# Patient Record
Sex: FEMALE | Race: WHITE | Employment: UNEMPLOYED | URBAN - METROPOLITAN AREA
[De-identification: names, ages, dates, MRNs, and addresses within clinical notes are randomized per-mention and may not be internally consistent; named-entity substitution may affect disease eponyms.]

---

## 2017-06-07 LAB — GRBS, EXTERNAL: NORMAL

## 2017-06-24 ENCOUNTER — HOSPITAL ENCOUNTER (OUTPATIENT)
Age: 32
Discharge: HOME | End: 2017-06-24
Attending: OBSTETRICS & GYNECOLOGY | Admitting: OBSTETRICS & GYNECOLOGY
Payer: COMMERCIAL

## 2017-06-24 VITALS
DIASTOLIC BLOOD PRESSURE: 74 MMHG | HEIGHT: 65 IN | RESPIRATION RATE: 18 BRPM | SYSTOLIC BLOOD PRESSURE: 126 MMHG | HEART RATE: 93 BPM | TEMPERATURE: 98.7 F

## 2017-06-24 PROCEDURE — 99283 EMERGENCY DEPT VISIT LOW MDM: CPT

## 2017-06-24 PROCEDURE — 59025 FETAL NON-STRESS TEST: CPT

## 2017-06-24 NOTE — IP AVS SNAPSHOT
[address] 
[phone] Patient: Que Churchill MRN: [id number] OXL:[birth year] You are allergic to the following No active allergies Recent Documentation Height OB Status Smoking Status 1.651 m Pregnant Never Smoker Emergency Contacts Name Discharge Info Relation Home Work Mobile Juancarlos Rizzo  Spouse [3] [phone] About your hospitalization You were admitted on:  June 24, 2017 You last received care in the:  [facility] 4 ANTEPARTUM You were discharged on:  June 24, 2017 Unit phone number:  [phone] Why you were hospitalized Your primary diagnosis was:  Not on File Providers Seen During Your Hospitalizations Provider Role Specialty Primary office phone Sarthak Domínguez MD Attending Provider Obstetrics & Gynecology [phone] Your Primary Care Physician (PCP) Primary Care Physician Office Phone Office Fax Conception Ziggy [phone] Follow-up Information None Your Appointments Monday June 26, 2017  8:45 AM EDT  
OB VISIT with Rosa West MD  
[facility] ([place] [address]) [address] [place] [id number] [phone] Current Discharge Medication List  
  
ASK your doctor about these medications Dose & Instructions Dispensing Information Comments Morning Noon Evening Bedtime  
 butalbital-acetaminophen-caffeine -40 mg per tablet Commonly known as:  Myrtle Carey Your last dose was: Your next dose is:    
   
   
 Dose:  2 Tab Take 2 Tabs by mouth every six (6) hours as needed for Pain. Max Daily Amount: 8 Tabs. Quantity:  30 Tab Refills:  3  
     
   
   
   
  
 folic acid 1 mg tablet Commonly known as:  Yelena Your last dose was: Your next dose is: Dose:  2 mg Take 2 mg by mouth daily. Refills:  0 PRENATAL DHA+COMPLETE PRENATAL -300 mg-mcg-mg Cmpk Generic drug:  [id number] bro-folic-dha Your last dose was: Your next dose is: Take  by mouth. Refills:  0  
     
   
   
   
  
 TYLENOL 325 mg tablet Generic drug:  acetaminophen Your last dose was: Your next dose is: Take  by mouth every four (4) hours as needed for Pain. Refills:  0  
     
   
   
   
  
 VITAMIN D3 1,000 unit tablet Generic drug:  cholecalciferol Your last dose was: Your next dose is: Take  by mouth daily. Refills:  0 Discharge Instructions Week 38 of Your Pregnancy: Care Instructions Your Care Instructions Believe it or not, your baby is almost here. You may have ideas about your baby's personality because of how much he or she moves. Or you may have noticed how he or she responds to sounds, warmth, cold, and light. You may even know what kind of music your baby likes. By now, you have a better idea of what to expect during delivery. You may have talked about your birth preferences with your doctor. But even if you want a vaginal birth, it is a good idea to learn about  births.  birth means that your baby is born through a cut (incision) in your lower belly. It is sometimes the best choice for the health of the baby and the mother. This care sheet can help you understand  births. It also gives you information about what to expect after your baby is born. And it helps you understand more about postpartum depression. Follow-up care is a key part of your treatment and safety. Be sure to make and go to all appointments, and call your doctor if you are having problems. It's also a good idea to know your test results and keep a list of the medicines you take. How can you care for yourself at home? Learn about  birth · Most C-sections are unplanned. They are done because of problems that occur during labor. These problems might include: 
¨ Labor that slows or stops. ¨ High blood pressure or other problems for the mother. ¨ Signs of distress in the baby. These signs may include a very fast or slow heart rate. · Although most mothers and babies do well after , it is major surgery. It has more risks than a vaginal delivery. · In some cases, a planned  may be safer than a vaginal delivery. This may be the case if: ¨ The mother has a health problem, such as a heart condition. ¨ The baby isn't in a head-down position for delivery. This is called a breech position. ¨ The uterus has scars from past surgeries. This could increase the chance of a tear in the uterus. ¨ There is a problem with the placenta. ¨ The mother has an infection, such as genital herpes, that could be spread to the baby. ¨ The mother is having twins or more. ¨ The baby weighs 9 to 10 pounds or more. · Because of the risks of , planned C-sections generally should be done only for medical reasons. And a planned  should be done at 39 weeks or later unless there is a medical reason to do it sooner. Know what to expect after delivery, and plan for the first few weeks at home · You, your baby, and your partner or  will get identification bands. Only people with matching bands can  the baby from the nursery. · You will learn how to feed, diaper, and bathe your baby. And you will learn how to care for the umbilical cord stump. If your baby will be circumcised, you will also learn how to care for that. · Ask people to wait to visit you until you are at home. And ask them to wash their hands before they touch your baby. · Make sure you have another adult in your home for at least 2 or 3 days after the birth. · During the first 2 weeks, limit when friends and family can visit. · Do not allow visitors who have colds or infections. Make sure all visitors are up to date with their vaccinations. Never let anyone smoke around your baby. · Try to nap when the baby naps. Be aware of postpartum depression · \"Baby blues\" are common for the first 1 to 2 weeks after birth. You may cry or feel sad or irritable for no reason. · For some women, these feelings last longer and are more intense. This is called postpartum depression. · If your symptoms last for more than a few weeks or you feel very depressed, ask your doctor for help. · Postpartum depression can be treated. Support groups and counseling can help. Sometimes medicine can also help. Where can you learn more? Go to http://mariam-adonay.info/. Enter B044 in the search box to learn more about \"Week 38 of Your Pregnancy: Care Instructions. \" Current as of: 2017 Content Version: 11.3 © 1052-2138 [employer]. Care instructions adapted under license by [employer] (which disclaims liability or warranty for this information). If you have questions about a medical condition or this instruction, always ask your healthcare professional. [place] [address] any warranty or liability for your use of this information. Pregnancy Precautions: Care Instructions Your Care Instructions There is no sure way to prevent labor before your due date ( labor) or to prevent most other pregnancy problems. But there are things you can do to increase your chances of a healthy pregnancy. Go to your appointments, follow your doctor's advice, and take good care of yourself. Eat well, and exercise (if your doctor agrees). And make sure to drink plenty of water. Follow-up care is a key part of your treatment and safety.  Be sure to make and go to all appointments, and call your doctor if you are having problems. It's also a good idea to know your test results and keep a list of the medicines you take. How can you care for yourself at home? · Make sure you go to your prenatal appointments. At each visit, your doctor will check your blood pressure. Your doctor will also check to see if you have protein in your urine. High blood pressure and protein in urine are signs of preeclampsia. This condition can be dangerous for you and your baby. · Drink plenty of fluids, enough so that your urine is light yellow or clear like water. Dehydration can cause contractions. If you have kidney, heart, or liver disease and have to limit fluids, talk with your doctor before you increase the amount of fluids you drink. · Tell your doctor right away if you notice any symptoms of an infection, such as: ¨ Burning when you urinate. ¨ A foul-smelling discharge from your vagina. ¨ Vaginal itching. ¨ Unexplained fever. ¨ Unusual pain or soreness in your uterus or lower belly. · Eat a balanced diet. Include plenty of foods that are high in calcium and iron. ¨ Foods high in calcium include milk, cheese, yogurt, almonds, and broccoli. ¨ Foods high in iron include red meat, shellfish, poultry, eggs, beans, raisins, whole-grain bread, and leafy green vegetables. · Do not smoke. If you need help quitting, talk to your doctor about stop-smoking programs and medicines. These can increase your chances of quitting for good. · Do not drink alcohol or use illegal drugs. · Follow your doctor's directions about activity. Your doctor will let you know how much, if any, exercise you can do. · Ask your doctor if you can have sex. If you are at risk for early labor, your doctor may ask you to not have sex. · Take care to prevent falls. During pregnancy, your joints are loose, and your balance is off. Sports such as bicycling, skiing, or in-line skating can increase your risk of falling.  And don't ride horses or motorcycles, dive, water ski, scuba dive, or parachute jump while you are pregnant. · Avoid getting very hot. Do not use saunas or hot tubs. Avoid staying out in the sun in hot weather for long periods. Take acetaminophen (Tylenol) to lower a high fever. · Do not take any over-the-counter or herbal medicines or supplements without talking to your doctor or pharmacist first. 
When should you call for help? Call 911 anytime you think you may need emergency care. For example, call if: 
· You passed out (lost consciousness). · You have severe vaginal bleeding. · You have severe pain in your belly or pelvis. · You have had fluid gushing or leaking from your vagina and you know or think the umbilical cord is bulging into your vagina. If this happens, immediately get down on your knees so your rear end (buttocks) is higher than your head. This will decrease the pressure on the cord until help arrives. Call your doctor now or seek immediate medical care if: 
· You have signs of preeclampsia, such as: 
¨ Sudden swelling of your face, hands, or feet. ¨ New vision problems (such as dimness or blurring). ¨ A severe headache. · You have any vaginal bleeding. · You have belly pain or cramping. · You have a fever. · You have had regular contractions (with or without pain) for an hour. This means that you have 8 or more within 1 hour or 4 or more in 20 minutes after you change your position and drink fluids. · You have a sudden release of fluid from your vagina. · You have low back pain or pelvic pressure that does not go away. · You notice that your baby has stopped moving or is moving much less than normal. 
Watch closely for changes in your health, and be sure to contact your doctor if you have any problems. Where can you learn more? Go to http://mariam-adonay.info/. Enter [id number] in the search box to learn more about \"Pregnancy Precautions: Care Instructions. \" Current as of: March 16, 2017 Content Version: 11.3 © 8703-3313 [employer]. Care instructions adapted under license by [employer] (which disclaims liability or warranty for this information). If you have questions about a medical condition or this instruction, always ask your healthcare professional. [address] any warranty or liability for your use of this information. Discharge Orders None Introducing [facility]! Dear Gabriel Mason: 
Thank you for requesting a Clari account. Our records indicate that you already have an active Clari account. You can access your account anytime at [email or web address] Did you know that you can access your hospital and ER discharge instructions at any time in Clari? You can also review all of your test results from your hospital stay or ER visit. Additional Information If you have questions, please visit the Frequently Asked Questions section of the Clari website at [email or web address] Remember, Clari is NOT to be used for urgent needs. For medical emergencies, dial 911. Now available from your iPhone and Android! General Information Please provide this summary of care documentation to your next provider. Patient Signature:  ____________________________________________________________ Date:  ____________________________________________________________  
  
Dora Hazel Provider Signature:  ____________________________________________________________ Date:  ____________________________________________________________

## 2017-06-24 NOTE — H&P
CC  Chief Complaint   Patient presents with    Decreased Fetal Movement       History:    33 y.o. female at 38w6d weeks gestation who requesting evaluation for Decreased fetal movement this morning. No ha/vision changes/epigastric pain or other complaints. HISTORY:  OB History    Para Term  AB Living   2 1 1   1   SAB TAB Ectopic Multiple Live Births       1      # Outcome Date GA Lbr Mor/2nd Weight Sex Delivery Anes PTL Lv   2 Current            1 Term 10/24/14 39w5d 12:13 / 00:47 3.8 kg F VAGINAL DELI EPIDURAL AN N YANCI      Obstetric Comments   Pt here for nob talk. Pt declines all genetic testing. All questions answered. Return 4 weeks for nob exam and repeat ultrasound. History   Sexual Activity    Sexual activity: Yes    Partners: Male    Birth control/ protection: None     Patient's last menstrual period was 2016 (exact date). Social History     Social History    Marital status:      Spouse name: N/A    Number of children: N/A    Years of education: N/A     Occupational History    Not on file. Social History Main Topics    Smoking status: Never Smoker    Smokeless tobacco: Never Used    Alcohol use 1.2 - 1.8 oz/week     2 - 3 Glasses of wine per week      Comment: Moderate    Drug use: No    Sexual activity: Yes     Partners: Male     Birth control/ protection: None     Other Topics Concern    Not on file     Social History Narrative       Past Surgical History:   Procedure Laterality Date    HX CRANIOTOMY      for meningioma-2016    HX ORTHOPAEDIC      foot surgery    HX OTHER SURGICAL      Foot    HX OTHER SURGICAL  2016    Brain Tumor    HX WISDOM TEETH EXTRACTION         Past Medical History:   Diagnosis Date    Heart abnormality     low BP    Heart abnormality     possible TIA?     Kidney disease     excess proteinuria in early pregnancy    Meningioma [facility])     Dr. Stella Mckeon follows    Migraines     PFO (patent foramen ovale) Dr. Nasrin Mcneill is Cardiologist    Proteinuria     during last pregnancy    Stomach discomfort          ROS:  As per hpi    PHYSICAL EXAM:  Blood pressure 126/74, pulse 93, temperature 98.7 °F (37.1 °C), resp. rate 18, height 5' 5\" (1.651 m), last menstrual period 09/25/2016, unknown if currently breastfeeding. General: healthy, alert, well developed, well nourished and cooperative  Resp:  breath sounds clear and equal bilaterally  Card:  RRR and no MRG  Abd: WNL. Fetal Assessment: Baseline FHR: 150 per minute     Fetal heart variability: moderate     Fetal Heart Rate decelerations: none     Fetal Heart Rate accelerations: yes   Prestentation: vertex by exam  Pelvic:   {performedExternal- normal EGBSU w/o lesions   Sve? 50/1/-2  Ext: no edema  Assessment:  33 y.o. female at 38w6d weeks gestation with decreased fetal movement  with reassuring fetal status  [unfilled]    Plan:  Discharge home with routine labor instructions and warning signs, Keep follow up appointment with regular provider as scheduled and Instructed in fetal activity monitoring    Yeni Shelton MD

## 2017-06-24 NOTE — PROGRESS NOTES
FETAL SURVEILLANCE TESTING SUMMARY    INDICATIONS:  decreased fetal movement    OBJECTIVE RESULTS:  Fetal heart variability: moderate  Fetal Heart Rate decelerations: none  Fetal Heart Rate accelerations: yes  Baseline FHR: 145 per minute  Uterine contractions: none    Fetal surveillance: reassuring    Cordell Young MD

## 2017-06-24 NOTE — DISCHARGE INSTRUCTIONS
Week 38 of Your Pregnancy: Care Instructions  Your Care Instructions    Believe it or not, your baby is almost here. You may have ideas about your baby's personality because of how much he or she moves. Or you may have noticed how he or she responds to sounds, warmth, cold, and light. You may even know what kind of music your baby likes. By now, you have a better idea of what to expect during delivery. You may have talked about your birth preferences with your doctor. But even if you want a vaginal birth, it is a good idea to learn about  births.  birth means that your baby is born through a cut (incision) in your lower belly. It is sometimes the best choice for the health of the baby and the mother. This care sheet can help you understand  births. It also gives you information about what to expect after your baby is born. And it helps you understand more about postpartum depression. Follow-up care is a key part of your treatment and safety. Be sure to make and go to all appointments, and call your doctor if you are having problems. It's also a good idea to know your test results and keep a list of the medicines you take. How can you care for yourself at home? Learn about  birth  · Most C-sections are unplanned. They are done because of problems that occur during labor. These problems might include:  ¨ Labor that slows or stops. ¨ High blood pressure or other problems for the mother. ¨ Signs of distress in the baby. These signs may include a very fast or slow heart rate. · Although most mothers and babies do well after , it is major surgery. It has more risks than a vaginal delivery. · In some cases, a planned  may be safer than a vaginal delivery. This may be the case if:  ¨ The mother has a health problem, such as a heart condition. ¨ The baby isn't in a head-down position for delivery. This is called a breech position.   ¨ The uterus has scars from past surgeries. This could increase the chance of a tear in the uterus. ¨ There is a problem with the placenta. ¨ The mother has an infection, such as genital herpes, that could be spread to the baby. ¨ The mother is having twins or more. ¨ The baby weighs 9 to 10 pounds or more. · Because of the risks of , planned C-sections generally should be done only for medical reasons. And a planned  should be done at 39 weeks or later unless there is a medical reason to do it sooner. Know what to expect after delivery, and plan for the first few weeks at home  · You, your baby, and your partner or  will get identification bands. Only people with matching bands can  the baby from the nursery. · You will learn how to feed, diaper, and bathe your baby. And you will learn how to care for the umbilical cord stump. If your baby will be circumcised, you will also learn how to care for that. · Ask people to wait to visit you until you are at home. And ask them to wash their hands before they touch your baby. · Make sure you have another adult in your home for at least 2 or 3 days after the birth. · During the first 2 weeks, limit when friends and family can visit. · Do not allow visitors who have colds or infections. Make sure all visitors are up to date with their vaccinations. Never let anyone smoke around your baby. · Try to nap when the baby naps. Be aware of postpartum depression  · \"Baby blues\" are common for the first 1 to 2 weeks after birth. You may cry or feel sad or irritable for no reason. · For some women, these feelings last longer and are more intense. This is called postpartum depression. · If your symptoms last for more than a few weeks or you feel very depressed, ask your doctor for help. · Postpartum depression can be treated. Support groups and counseling can help. Sometimes medicine can also help. Where can you learn more?   Go to http://mariam-adonay.info/. Enter B044 in the search box to learn more about \"Week 38 of Your Pregnancy: Care Instructions. \"  Current as of: 2017  Content Version: 11.3  © 6480-7573 [employer]. Care instructions adapted under license by [employer] (which disclaims liability or warranty for this information). If you have questions about a medical condition or this instruction, always ask your healthcare professional. [address] any warranty or liability for your use of this information. Pregnancy Precautions: Care Instructions  Your Care Instructions  There is no sure way to prevent labor before your due date ( labor) or to prevent most other pregnancy problems. But there are things you can do to increase your chances of a healthy pregnancy. Go to your appointments, follow your doctor's advice, and take good care of yourself. Eat well, and exercise (if your doctor agrees). And make sure to drink plenty of water. Follow-up care is a key part of your treatment and safety. Be sure to make and go to all appointments, and call your doctor if you are having problems. It's also a good idea to know your test results and keep a list of the medicines you take. How can you care for yourself at home? · Make sure you go to your prenatal appointments. At each visit, your doctor will check your blood pressure. Your doctor will also check to see if you have protein in your urine. High blood pressure and protein in urine are signs of preeclampsia. This condition can be dangerous for you and your baby. · Drink plenty of fluids, enough so that your urine is light yellow or clear like water. Dehydration can cause contractions. If you have kidney, heart, or liver disease and have to limit fluids, talk with your doctor before you increase the amount of fluids you drink.   · Tell your doctor right away if you notice any symptoms of an infection, such as:  ¨ Burning when you urinate. ¨ A foul-smelling discharge from your vagina. ¨ Vaginal itching. ¨ Unexplained fever. ¨ Unusual pain or soreness in your uterus or lower belly. · Eat a balanced diet. Include plenty of foods that are high in calcium and iron. ¨ Foods high in calcium include milk, cheese, yogurt, almonds, and broccoli. ¨ Foods high in iron include red meat, shellfish, poultry, eggs, beans, raisins, whole-grain bread, and leafy green vegetables. · Do not smoke. If you need help quitting, talk to your doctor about stop-smoking programs and medicines. These can increase your chances of quitting for good. · Do not drink alcohol or use illegal drugs. · Follow your doctor's directions about activity. Your doctor will let you know how much, if any, exercise you can do. · Ask your doctor if you can have sex. If you are at risk for early labor, your doctor may ask you to not have sex. · Take care to prevent falls. During pregnancy, your joints are loose, and your balance is off. Sports such as bicycling, skiing, or in-line skating can increase your risk of falling. And don't ride horses or motorcycles, dive, water ski, scuba dive, or parachute jump while you are pregnant. · Avoid getting very hot. Do not use saunas or hot tubs. Avoid staying out in the sun in hot weather for long periods. Take acetaminophen (Tylenol) to lower a high fever. · Do not take any over-the-counter or herbal medicines or supplements without talking to your doctor or pharmacist first.  When should you call for help? Call 911 anytime you think you may need emergency care. For example, call if:  · You passed out (lost consciousness). · You have severe vaginal bleeding. · You have severe pain in your belly or pelvis. · You have had fluid gushing or leaking from your vagina and you know or think the umbilical cord is bulging into your vagina.  If this happens, immediately get down on your knees so your rear end (buttocks) is higher than your head. This will decrease the pressure on the cord until help arrives. Call your doctor now or seek immediate medical care if:  · You have signs of preeclampsia, such as:  ¨ Sudden swelling of your face, hands, or feet. ¨ New vision problems (such as dimness or blurring). ¨ A severe headache. · You have any vaginal bleeding. · You have belly pain or cramping. · You have a fever. · You have had regular contractions (with or without pain) for an hour. This means that you have 8 or more within 1 hour or 4 or more in 20 minutes after you change your position and drink fluids. · You have a sudden release of fluid from your vagina. · You have low back pain or pelvic pressure that does not go away. · You notice that your baby has stopped moving or is moving much less than normal.  Watch closely for changes in your health, and be sure to contact your doctor if you have any problems. Where can you learn more? Go to http://mariam-adonay.info/. Enter [id number] in the search box to learn more about \"Pregnancy Precautions: Care Instructions. \"  Current as of: March 16, 2017  Content Version: 11.3  © 2866-0435 [employer]. Care instructions adapted under license by [employer] (which disclaims liability or warranty for this information). If you have questions about a medical condition or this instruction, always ask your healthcare professional. [place] [address] any warranty or liability for your use of this information.

## 2017-06-24 NOTE — PROGRESS NOTES
MD in assessing SVE as charted. Pt to discharge to home. Discharge instructions provided and reviewed. Questions answered.

## 2017-06-24 NOTE — IP AVS SNAPSHOT
Current Discharge Medication List  
  
ASK your doctor about these medications Dose & Instructions Dispensing Information Comments Morning Noon Evening Bedtime  
 butalbital-acetaminophen-caffeine -40 mg per tablet Commonly known as:  Camila Xiao Your last dose was: Your next dose is:    
   
   
 Dose:  2 Tab Take 2 Tabs by mouth every six (6) hours as needed for Pain. Max Daily Amount: 8 Tabs. Quantity:  30 Tab Refills:  3  
     
   
   
   
  
 folic acid 1 mg tablet Commonly known as:  Yelena Your last dose was: Your next dose is:    
   
   
 Dose:  2 mg Take 2 mg by mouth daily. Refills:  0 PRENATAL DHA+COMPLETE PRENATAL -300 mg-mcg-mg Cmpk Generic drug:  [id number] bro-folic-dha Your last dose was: Your next dose is: Take  by mouth. Refills:  0  
     
   
   
   
  
 TYLENOL 325 mg tablet Generic drug:  acetaminophen Your last dose was: Your next dose is: Take  by mouth every four (4) hours as needed for Pain. Refills:  0  
     
   
   
   
  
 VITAMIN D3 1,000 unit tablet Generic drug:  cholecalciferol Your last dose was: Your next dose is: Take  by mouth daily. Refills:  0

## 2017-07-03 ENCOUNTER — ANESTHESIA (OUTPATIENT)
Dept: LABOR AND DELIVERY | Age: 32
DRG: 372 | End: 2017-07-03
Payer: COMMERCIAL

## 2017-07-03 ENCOUNTER — ANESTHESIA EVENT (OUTPATIENT)
Dept: LABOR AND DELIVERY | Age: 32
DRG: 372 | End: 2017-07-03
Payer: COMMERCIAL

## 2017-07-03 ENCOUNTER — HOSPITAL ENCOUNTER (INPATIENT)
Age: 32
LOS: 1 days | Discharge: HOME | DRG: 372 | End: 2017-07-04
Attending: OBSTETRICS & GYNECOLOGY | Admitting: OBSTETRICS & GYNECOLOGY
Payer: COMMERCIAL

## 2017-07-03 DIAGNOSIS — Z37.9 NORMAL LABOR: Primary | ICD-10-CM

## 2017-07-03 LAB
ABO + RH BLD: NORMAL
BASE DEFICIT BLDCOA-SCNC: 2.7 MMOL/L (ref 0–2)
BASE DEFICIT BLDCOV-SCNC: 4 MMOL/L (ref 1.9–7.7)
BDY SITE: ABNORMAL
BDY SITE: NORMAL
BLOOD GROUP ANTIBODIES SERPL: NORMAL
ERYTHROCYTE [DISTWIDTH] IN BLOOD BY AUTOMATED COUNT: 13.8 % (ref 11.9–14.6)
HCO3 BLDCOA-SCNC: 25 MMOL/L (ref 22–26)
HCO3 BLDV-SCNC: 21 MMOL/L
HCT VFR BLD AUTO: 37.5 % (ref 35.8–46.3)
HGB BLD-MCNC: 12.7 G/DL (ref 11.7–15.4)
MCH RBC QN AUTO: 31.1 PG (ref 26.1–32.9)
MCHC RBC AUTO-ENTMCNC: 33.9 G/DL (ref 31.4–35)
MCV RBC AUTO: 91.9 FL (ref 79.6–97.8)
PCO2 BLDCOA: 58 MMHG (ref 33–49)
PCO2 BLDCOV: 36 MMHG (ref 14.1–43.3)
PH BLDCOA: 7.26 [PH] (ref 7.21–7.31)
PH BLDCOV: 7.37 [PH] (ref 7.2–7.44)
PLATELET # BLD AUTO: 123 K/UL (ref 150–450)
PMV BLD AUTO: 11.4 FL (ref 10.8–14.1)
PO2 BLDCOA: 12 MMHG (ref 9–19)
PO2 BLDV: 31 MMHG (ref 30.4–57.2)
RBC # BLD AUTO: 4.08 M/UL (ref 4.05–5.25)
SERVICE CMNT-IMP: ABNORMAL
SERVICE CMNT-IMP: NORMAL
SPECIMEN EXP DATE BLD: NORMAL
WBC # BLD AUTO: 11.6 K/UL (ref 4.3–11.1)

## 2017-07-03 PROCEDURE — 99283 EMERGENCY DEPT VISIT LOW MDM: CPT

## 2017-07-03 PROCEDURE — 75410000003 HC RECOV DEL/VAG/CSECN EA 0.5 HR

## 2017-07-03 PROCEDURE — 86900 BLOOD TYPING SEROLOGIC ABO: CPT | Performed by: OBSTETRICS & GYNECOLOGY

## 2017-07-03 PROCEDURE — 85027 COMPLETE CBC AUTOMATED: CPT | Performed by: OBSTETRICS & GYNECOLOGY

## 2017-07-03 PROCEDURE — 76060000078 HC EPIDURAL ANESTHESIA

## 2017-07-03 PROCEDURE — 75410000002 HC LABOR FEE PER 1 HR

## 2017-07-03 PROCEDURE — 77030018846 HC SOL IRR STRL H20 ICUM -A

## 2017-07-03 PROCEDURE — 65270000029 HC RM PRIVATE

## 2017-07-03 PROCEDURE — A4300 CATH IMPL VASC ACCESS PORTAL: HCPCS | Performed by: NURSE ANESTHETIST, CERTIFIED REGISTERED

## 2017-07-03 PROCEDURE — 75410000000 HC DELIVERY VAGINAL/SINGLE

## 2017-07-03 PROCEDURE — 4A1HXCZ MONITORING OF PRODUCTS OF CONCEPTION, CARDIAC RATE, EXTERNAL APPROACH: ICD-10-PCS | Performed by: OBSTETRICS & GYNECOLOGY

## 2017-07-03 PROCEDURE — 82803 BLOOD GASES ANY COMBINATION: CPT

## 2017-07-03 PROCEDURE — 74011250636 HC RX REV CODE- 250/636

## 2017-07-03 PROCEDURE — 74011250636 HC RX REV CODE- 250/636: Performed by: OBSTETRICS & GYNECOLOGY

## 2017-07-03 PROCEDURE — 77030014125 HC TY EPDRL BBMI -B: Performed by: NURSE ANESTHETIST, CERTIFIED REGISTERED

## 2017-07-03 PROCEDURE — 59025 FETAL NON-STRESS TEST: CPT

## 2017-07-03 RX ORDER — ROPIVACAINE HYDROCHLORIDE 5 MG/ML
INJECTION, SOLUTION EPIDURAL; INFILTRATION; PERINEURAL AS NEEDED
Status: DISCONTINUED | OUTPATIENT
Start: 2017-07-03 | End: 2017-07-03 | Stop reason: HOSPADM

## 2017-07-03 RX ORDER — LIDOCAINE HYDROCHLORIDE 10 MG/ML
1 INJECTION INFILTRATION; PERINEURAL
Status: DISCONTINUED | OUTPATIENT
Start: 2017-07-03 | End: 2017-07-03 | Stop reason: HOSPADM

## 2017-07-03 RX ORDER — BUTALBITAL, ACETAMINOPHEN AND CAFFEINE 50; 325; 40 MG/1; MG/1; MG/1
2 TABLET ORAL
Status: DISCONTINUED | OUTPATIENT
Start: 2017-07-03 | End: 2017-07-05 | Stop reason: HOSPADM

## 2017-07-03 RX ORDER — SODIUM CHLORIDE 0.9 % (FLUSH) 0.9 %
5-10 SYRINGE (ML) INJECTION EVERY 8 HOURS
Status: DISCONTINUED | OUTPATIENT
Start: 2017-07-03 | End: 2017-07-03

## 2017-07-03 RX ORDER — LIDOCAINE HYDROCHLORIDE 20 MG/ML
JELLY TOPICAL
Status: DISCONTINUED | OUTPATIENT
Start: 2017-07-03 | End: 2017-07-03 | Stop reason: HOSPADM

## 2017-07-03 RX ORDER — ZOLPIDEM TARTRATE 5 MG/1
5 TABLET ORAL
Status: DISCONTINUED | OUTPATIENT
Start: 2017-07-03 | End: 2017-07-05 | Stop reason: HOSPADM

## 2017-07-03 RX ORDER — IBUPROFEN 400 MG/1
400 TABLET ORAL
Status: DISCONTINUED | OUTPATIENT
Start: 2017-07-03 | End: 2017-07-05 | Stop reason: HOSPADM

## 2017-07-03 RX ORDER — MINERAL OIL
120 OIL (ML) ORAL
Status: DISCONTINUED | OUTPATIENT
Start: 2017-07-03 | End: 2017-07-03 | Stop reason: HOSPADM

## 2017-07-03 RX ORDER — SODIUM CHLORIDE 0.9 % (FLUSH) 0.9 %
5-10 SYRINGE (ML) INJECTION AS NEEDED
Status: DISCONTINUED | OUTPATIENT
Start: 2017-07-03 | End: 2017-07-03

## 2017-07-03 RX ORDER — ROPIVACAINE HYDROCHLORIDE 2 MG/ML
INJECTION, SOLUTION EPIDURAL; INFILTRATION; PERINEURAL
Status: DISCONTINUED | OUTPATIENT
Start: 2017-07-03 | End: 2017-07-03 | Stop reason: HOSPADM

## 2017-07-03 RX ORDER — SIMETHICONE 80 MG
80 TABLET,CHEWABLE ORAL
Status: DISCONTINUED | OUTPATIENT
Start: 2017-07-03 | End: 2017-07-05 | Stop reason: HOSPADM

## 2017-07-03 RX ORDER — HYDROCODONE BITARTRATE AND ACETAMINOPHEN 10; 325 MG/1; MG/1
1 TABLET ORAL
Status: DISCONTINUED | OUTPATIENT
Start: 2017-07-03 | End: 2017-07-05 | Stop reason: HOSPADM

## 2017-07-03 RX ORDER — BUTORPHANOL TARTRATE 1 MG/ML
1 INJECTION INTRAMUSCULAR; INTRAVENOUS
Status: DISCONTINUED | OUTPATIENT
Start: 2017-07-03 | End: 2017-07-03 | Stop reason: HOSPADM

## 2017-07-03 RX ORDER — HYDROCORTISONE ACETATE PRAMOXINE HCL 2.5; 1 G/100G; G/100G
CREAM TOPICAL 2 TIMES DAILY
Status: DISCONTINUED | OUTPATIENT
Start: 2017-07-04 | End: 2017-07-05 | Stop reason: HOSPADM

## 2017-07-03 RX ORDER — DIPHENHYDRAMINE HCL 25 MG
25 CAPSULE ORAL
Status: DISCONTINUED | OUTPATIENT
Start: 2017-07-03 | End: 2017-07-05 | Stop reason: HOSPADM

## 2017-07-03 RX ORDER — NALOXONE HYDROCHLORIDE 0.4 MG/ML
0.4 INJECTION, SOLUTION INTRAMUSCULAR; INTRAVENOUS; SUBCUTANEOUS AS NEEDED
Status: DISCONTINUED | OUTPATIENT
Start: 2017-07-03 | End: 2017-07-05 | Stop reason: HOSPADM

## 2017-07-03 RX ORDER — OXYTOCIN/RINGER'S LACTATE 15/250 ML
250 PLASTIC BAG, INJECTION (ML) INTRAVENOUS ONCE
Status: COMPLETED | OUTPATIENT
Start: 2017-07-03 | End: 2017-07-03

## 2017-07-03 RX ORDER — HYDROCODONE BITARTRATE AND ACETAMINOPHEN 5; 325 MG/1; MG/1
1 TABLET ORAL
Status: DISCONTINUED | OUTPATIENT
Start: 2017-07-03 | End: 2017-07-05 | Stop reason: HOSPADM

## 2017-07-03 RX ORDER — DEXTROSE, SODIUM CHLORIDE, SODIUM LACTATE, POTASSIUM CHLORIDE, AND CALCIUM CHLORIDE 5; .6; .31; .03; .02 G/100ML; G/100ML; G/100ML; G/100ML; G/100ML
125 INJECTION, SOLUTION INTRAVENOUS CONTINUOUS
Status: DISCONTINUED | OUTPATIENT
Start: 2017-07-03 | End: 2017-07-03

## 2017-07-03 RX ADMIN — ROPIVACAINE HYDROCHLORIDE 13 ML: 5 INJECTION, SOLUTION EPIDURAL; INFILTRATION; PERINEURAL at 18:03

## 2017-07-03 RX ADMIN — ROPIVACAINE HYDROCHLORIDE 8 ML/HR: 2 INJECTION, SOLUTION EPIDURAL; INFILTRATION; PERINEURAL at 18:06

## 2017-07-03 RX ADMIN — Medication 15000 MILLI-UNITS/HR: at 20:24

## 2017-07-03 NOTE — IP AVS SNAPSHOT
Thaddeus Navneet 
 
 
 [id number] 
[phone] Patient: Madelyn Akers MRN: [id number] AMU:[birth year] You are allergic to the following No active allergies Recent Documentation Breastfeeding? OB Status Smoking Status Unknown Recent pregnancy Never Smoker Emergency Contacts Name Discharge Info Relation Home Work Mobile Juancarlos Rizzo  Spouse [3] [phone] About your hospitalization You were admitted on:  July 3, 2017 You last received care in the:  [address] [facility] You were discharged on:  July 4, 2017 Unit phone number:  [phone] Why you were hospitalized Your primary diagnosis was:  Not on File Your diagnoses also included:  Normal Labor Providers Seen During Your Hospitalizations Provider Role Specialty Primary office phone Yahaira Tse DO Attending Provider Obstetrics & Gynecology [phone] Your Primary Care Physician (PCP) Primary Care Physician Office Phone Office Fax [place] [phone] Follow-up Information Follow up With Details Comments Contact Info Thera Goodell, MD   [address] [facility] [address] [facility] 
[phone] Hua Perry MD In 2 weeks  [address] [place] [address] OB GYN Group [facility] [address] 
[phone] Your Appointments Thursday July 20, 2017 10:30 AM EDT [facility]Part[facility] 2 week with Cade Gandhi MD  
[address] [place] [address] [phone] Current Discharge Medication List  
  
START taking these medications Dose & Instructions Dispensing Information Comments Morning Noon Evening Bedtime HYDROcodone-acetaminophen 5-325 mg per tablet Commonly known as:  Alma Rosa Mcdonnell  
   
 Your last dose was: Your next dose is:    
   
   
 Dose:  1 Tab Take 1 Tab by mouth every four (4) hours as needed. Max Daily Amount: 6 Tabs. Quantity:  20 Tab Refills:  0  
     
   
   
   
  
 ibuprofen 800 mg tablet Commonly known as:  MOTRIN Your last dose was: Your next dose is:    
   
   
 Dose:  800 mg Take 1 Tab by mouth every eight (8) hours as needed. Quantity:  90 Tab Refills:  0  
     
   
   
   
  
 WINN'S NIPPLE OINTMENT AMB ONLY Your last dose was: Your next dose is:    
   
   
 Apply  to affected area three (3) times daily. Quantity:  30 g Refills:  3 CONTINUE these medications which have NOT CHANGED Dose & Instructions Dispensing Information Comments Morning Noon Evening Bedtime  
 pramoxine-hydrocortisone 1-1 % rectal cream  
Commonly known as:  ANALPRAM HC 1% Your last dose was: Your next dose is: Insert  into rectum two (2) times a day. Quantity:  30 g Refills:  3 PRENATAL DHA+COMPLETE PRENATAL -300 mg-mcg-mg Cmpk Generic drug:  [id number] bro-folic-dha Your last dose was: Your next dose is: Take  by mouth. Refills:  0 STOP taking these medications   
 butalbital-acetaminophen-caffeine -40 mg per tablet Commonly known as:  FIORICET, ESGIC  
   
  
 folic acid 1 mg tablet Commonly known as:  FOLVITE  
   
  
 TYLENOL 325 mg tablet Generic drug:  acetaminophen VITAMIN D3 1,000 unit tablet Generic drug:  cholecalciferol Where to Get Your Medications These medications were sent to [facility] [address], [address] Phone:  [phone]  
  ibuprofen 800 mg tablet Information on where to get these meds will be given to you by the nurse or doctor. ! Ask your nurse or doctor about these medications HYDROcodone-acetaminophen 5-325 mg per tablet WINN'S NIPPLE OINTMENT AMB ONLY Discharge Instructions Vaginal Childbirth: Care Instructions Your Care Instructions Your body will slowly heal in the next few weeks. It is easy to get too tired and overwhelmed during the first weeks after your baby is born. Changes in your hormones can shift your mood without warning. You may find it hard to meet the extra demands on your energy and time. Take it easy on yourself. Follow-up care is a key part of your treatment and safety. Be sure to make and go to all appointments, and call your doctor if you are having problems. It's also a good idea to know your test results and keep a list of the medicines you take. How can you care for yourself at home? · Vaginal bleeding and cramps ¨ After delivery, you will have a bloody discharge from the vagina. This will turn pink within a week and then white or yellow after about 10 days. It may last for 2 to 4 weeks or longer, until the uterus has healed. Use pads instead of tampons until you stop bleeding. ¨ Do not worry if you pass some blood clots, as long as they are smaller than a golf ball. If you have a tear or stitches in your vaginal area, change the pad at least every 4 hours to prevent soreness and infection. ¨ You may have cramps for the first few days after childbirth. These are normal and occur as the uterus shrinks to normal size. Take an over-the-counter pain medicine, such as acetaminophen (Tylenol), ibuprofen (Advil, Motrin), or naproxen (Aleve), for cramps. Read and follow all instructions on the label. Do not take aspirin, because it can cause more bleeding. ¨ Do not take two or more pain medicines at the same time unless the doctor told you to. Many pain medicines have acetaminophen, which is Tylenol. Too much acetaminophen (Tylenol) can be harmful. · Stitches ¨ If you have stitches, they will dissolve on their own and do not need to be removed. Follow your doctor's instructions for cleaning the stitched area. ¨ Put ice or a cold pack on your painful area for 10 to 20 minutes at a time, several times a day, for the first few days. Put a thin cloth between the ice and your skin. ¨ Sit in a few inches of warm water (sitz bath) 3 times a day and after bowel movements. The warm water helps with pain and itching. If you do not have a tub, a warm shower might help. · Breast fullness ¨ Your breasts may overfill (engorge) in the first few days after delivery. To help milk flow and to relieve pain, warm your breasts in the shower or by using warm, moist towels before nursing. ¨ If you are not nursing, do not put warmth on your breasts or touch your breasts. Wear a tight bra or sports bra and use ice until the fullness goes away. This usually takes 2 to 3 days. ¨ Put ice or a cold pack on your breast after nursing to reduce swelling and pain. Put a thin cloth between the ice and your skin. · Activity ¨ Eat a balanced diet. Do not try to lose weight by cutting calories. Keep taking your prenatal vitamins, or take a multivitamin. ¨ Get as much rest as you can. Try to take naps when your baby sleeps during the day. ¨ Get some exercise every day. But do not do any heavy exercise until your doctor says it is okay. ¨ Wait until you are healed (about 4 to 6 weeks) before you have sexual intercourse. Your doctor will tell you when it is okay to have sex. ¨ Talk to your doctor about birth control. You can get pregnant even before your period returns. Also, you can get pregnant while you are breastfeeding. · Mental health ¨ It is normal to have some sadness, anxiety, sleeplessness, and mood swings after you go home. If you feel upset or hopeless for more than a few days or are having trouble doing the things you need to do, talk to your doctor. · Constipation and hemorrhoids ¨ Drink plenty of fluids, enough so that your urine is light yellow or clear like water. If you have kidney, heart, or liver disease and have to limit fluids, talk with your doctor before you increase the amount of fluids you drink. ¨ Eat plenty of fiber each day. Have a bran muffin or bran cereal for breakfast, and try eating a piece of fruit for a mid-afternoon snack. ¨ For painful, itchy hemorrhoids, put ice or a cold pack on the area several times a day for 10 minutes at a time. Follow this by putting a warm compress on the area for another 10 to 20 minutes or by sitting in a shallow, warm bath. When should you call for help? Call 911 anytime you think you may need emergency care. For example, call if: 
· You are thinking of hurting yourself, your baby, or anyone else. · You have sudden, severe pain in your belly. · You passed out (lost consciousness). Call your doctor now or seek immediate medical care if: 
· You have severe vaginal bleeding. · You are soaking through a pad each hour for 2 or more hours. · Your vaginal bleeding seems to be getting heavier or is still bright red 4 days after delivery. · You are dizzy or lightheaded, or you feel like you may faint. · You are vomiting or cannot keep fluids down. · You have a fever. · You have new or more belly pain. · You pass tissue (not just blood). · Your vaginal discharge smells bad. · Your belly feels tender or full and hard. · Your breasts are continuously painful or red. · You feel sad, anxious, or hopeless for more than a few days. Watch closely for changes in your health, and be sure to contact your doctor if you have any problems. Where can you learn more? Go to http://mariam-adonay.info/. Enter N174 in the search box to learn more about \"Vaginal Childbirth: Care Instructions. \" Current as of: March 16, 2017 Content Version: 11.3 © 7442-7878 [employer].  Care instructions adapted under license by [address] (which disclaims liability or warranty for this information). If you have questions about a medical condition or this instruction, always ask your healthcare professional. [address] any warranty or liability for your use of this information. Discharge Orders None [facility]! Dear Ruddy: 
Thank you for requesting a AltspaceVR account. Our records indicate that you already have an active AltspaceVR account. You can access your account anytime at [email or web address] Did you know that you can access your hospital and ER discharge instructions at any time in AltspaceVR? You can also review all of your test results from your hospital stay or ER visit. Additional Information If you have questions, please visit the Frequently Asked Questions section of the AltspaceVR website at [email or web address] Remember, AltspaceVR is NOT to be used for urgent needs. For medical emergencies, dial 911. Now available from your iPhone and Android! General Information Please provide this summary of care documentation to your next provider. Patient Signature:  ____________________________________________________________ Date:  ____________________________________________________________  
  
Tran De La Torre Provider Signature:  ____________________________________________________________ Date:  ____________________________________________________________

## 2017-07-03 NOTE — ANESTHESIA PREPROCEDURE EVALUATION
Anesthetic History   No history of anesthetic complications            Review of Systems / Medical History  Patient summary reviewed and pertinent labs reviewed    Pulmonary  Within defined limits                 Neuro/Psych   Within defined limits           Cardiovascular  Within defined limits                Exercise tolerance: >4 METS     GI/Hepatic/Renal  Within defined limits              Endo/Other  Within defined limits           Other Findings   Comments: Hx meningioma  Migraines  PFO           Physical Exam    Airway  Mallampati: II  TM Distance: 4 - 6 cm  Neck ROM: normal range of motion   Mouth opening: Normal     Cardiovascular  Regular rate and rhythm,  S1 and S2 normal,  no murmur, click, rub, or gallop  Rhythm: regular  Rate: normal         Dental  No notable dental hx       Pulmonary  Breath sounds clear to auscultation               Abdominal  GI exam deferred       Other Findings            Anesthetic Plan    ASA: 2  Anesthesia type: epidural      Post-op pain plan if not by surgeon: indwelling epidural catheter      Anesthetic plan and risks discussed with: Patient and Spouse

## 2017-07-03 NOTE — PROGRESS NOTES
Dr Jose Solis updated to pt status and request to walk. Pt may walk. Into room to notify pt- pt sitting on the toilet and breathing through her contractions. Will walk 30- 60 minutes and return for a recheck.

## 2017-07-03 NOTE — ANESTHESIA PROCEDURE NOTES
Epidural Block    Start time: 7/3/2017 5:59 PM  End time: 7/3/2017 6:08 PM  Performed by: Hortencia Aguiar  Authorized by: Hortencia Aguiar     Pre-Procedure  Indication: at surgeon's request and labor epidural    Preanesthetic Checklist: patient identified, risks and benefits discussed, anesthesia consent, site marked, patient being monitored, timeout performed and anesthesia consent    Timeout Time: 17:59        Epidural:   Patient position:  Seated  Prep region:  Lumbar  Prep: Chlorhexidine    Location:  L3-4    Needle and Epidural Catheter:   Needle Type:  Tuohy  Needle Gauge:  17 G  Injection Technique:  Loss of resistance using saline  Attempts:  1  Catheter Size:  19 G  Depth in Epidural Space (cm):  5  Events: no blood with aspiration, no cerebrospinal fluid with aspiration, no paresthesia and negative aspiration test    Test Dose:  Negative    Assessment:   Catheter Secured:  Tape and tegaderm  Insertion:  Uncomplicated  Patient tolerance:  Patient tolerated the procedure well with no immediate complications

## 2017-07-03 NOTE — PROGRESS NOTES
sve by RN: 4[birth year]5/80/-1, states contractions are getting stronger and more frequent. Unsure about being discharged home. Would like to walk x1 hour vs d/c home. Will call dr Negra Mckeon for orders.

## 2017-07-03 NOTE — H&P
History & Physical    Name: Sreedhar Shane MRN: [id number]  SSN: [id number]    YOB: 1985  Age: 33 y.o. Sex: female      Chief c/o :painful contractions  Subjective:     Estimated Date of Delivery: 17  OB History    Para Term  AB Living   2 1 1   1   SAB TAB Ectopic Molar Multiple Live Births        1      # Outcome Date GA Lbr Mor/2nd Weight Sex Delivery Anes PTL Lv   2 Current            1 Term 10/24/14 39w5d 12:13 / 00:47 3.8 kg F VAGINAL DELI EPIDURAL AN N YANCI      Obstetric Comments   Pt here for nob talk. Pt declines all genetic testing. All questions answered. Return 4 weeks for nob exam and repeat ultrasound. Ms. Allan Ordaz is admitted with pregnancy at 40w1d for active labor. Prenatal course was normal. Please see prenatal records for details. C/o contractions beginning around 8:00am - becoming closer together and more painful through the day. No vag bleeding. No loss of fluid. Good fetal movement    Past Medical History:   Diagnosis Date    Heart abnormality     low BP    Heart abnormality     possible TIA?  Kidney disease     excess proteinuria in early pregnancy    Meningioma [facility])     Dr. Ashly Amezcua follows    Migraines     PFO (patent foramen ovale)     Dr. Ralph Rudd is Cardiologist    Proteinuria     during last pregnancy    Stomach discomfort      Past Surgical History:   Procedure Laterality Date    HX CRANIOTOMY      for meningioma-2016    HX ORTHOPAEDIC      foot surgery    HX OTHER SURGICAL      Foot    HX OTHER SURGICAL      Brain Tumor    [address]X [address] EXTRACTION       Social History     Occupational History    Not on file.      Social History Main Topics    Smoking status: Never Smoker    Smokeless tobacco: Never Used    Alcohol use 1.2 - 1.8 oz/week     2 - 3 Glasses of wine per week      Comment: Moderate    Drug use: No    Sexual activity: Yes     Partners: Male     Birth control/ protection: None     Family History   Problem Relation Age of Onset    Diabetes Maternal Grandmother     Heart Attack Maternal Grandmother     Cancer Maternal Grandmother     Diabetes Maternal Grandfather     Heart Attack Maternal Grandfather     Heart Disease Maternal Grandfather     Heart Disease Mother        No Known Allergies  Prior to Admission medications    Medication Sig Start Date End Date Taking? Authorizing Provider   pramoxine-hydrocortisone (ANALPRAM HC 1%) 1-1 % rectal cream Insert  into rectum two (2) times a day. 6/29/17  Yes Beckie Come, NP   acetaminophen (TYLENOL) 325 mg tablet Take  by mouth every four (4) hours as needed for Pain. Yes Historical Provider   cholecalciferol (VITAMIN D3) 1,000 unit tablet Take  by mouth daily. Yes Historical Provider   folic acid (FOLVITE) 1 mg tablet Take 2 mg by mouth daily. Yes Historical Provider   PNV no.24-iron-folic acid-dha (PRENATAL DHA+COMPLETE PRENATAL) -300 mg-mcg-mg cmpk Take  by mouth. Yes Historical Provider   butalbital-acetaminophen-caffeine (FIORICET, ESGIC) -40 mg per tablet Take 2 Tabs by mouth every six (6) hours as needed for Pain. Max Daily Amount: 8 Tabs. 1/19/17   Yady Kamara MD        Review of Systems: A comprehensive review of systems was negative except for that written in the HPI. Objective:     Vitals:  Vitals:    07/03/17 1441   BP: 131/76   Pulse: 90   Resp: 18   Temp: 98.7 °F (37.1 °C)        Physical Exam:  Patient without distress.   Heart: Regular rate and rhythm  Lung: clear to auscultation throughout lung fields, no wheezes, no rales, no rhonchi and normal respiratory effort  Back: costovertebral angle tenderness absent  Abdomen: soft, nontender  Fundus: soft and non tender  Perineum: blood absent, amniotic fluid absent  Cervical Exam: 5 cm dilated    100% effaced    -1 station    Presenting Part: cephalic  Lower Extremities:  - Edema No   - Patellar Reflexes: 2+ bilaterally  Membranes:  Intact  Fetal Heart Rate: Reactive    Prenatal Labs:   Lab Results   Component Value Date/Time    ABO/Rh(D) A POSITIVE 10/24/2014 06:30 AM    Rubella, External immune 2016    GrBStrep, External NEG 2017    HBsAg, External neg 2016    HIV, External neg 2016    RPR, External NR 2016    Gonorrhea, External neg 2014    Chlamydia, External neg 2014    ABO,Rh a pos 2014         Assessment/Plan:     Active Problems:    Normal labor (7/3/2017)         Plan: 34 yo  at 40w1d with active labor. Admit for Reassuring fetal status, Labor  Progressing normally, Continue plan for vaginal delivery. Group B Strep was negative.  Desires epidural.    Signed By:  Dontrell Sullivan MD     July 3, 2017

## 2017-07-04 VITALS
TEMPERATURE: 98.5 F | SYSTOLIC BLOOD PRESSURE: 112 MMHG | DIASTOLIC BLOOD PRESSURE: 63 MMHG | RESPIRATION RATE: 16 BRPM | HEART RATE: 79 BPM

## 2017-07-04 PROCEDURE — 65270000029 HC RM PRIVATE

## 2017-07-04 PROCEDURE — 74011250637 HC RX REV CODE- 250/637: Performed by: OBSTETRICS & GYNECOLOGY

## 2017-07-04 RX ORDER — IBUPROFEN 800 MG/1
800 TABLET ORAL
Qty: 90 TAB | Refills: 0 | Status: SHIPPED | OUTPATIENT
Start: 2017-07-04

## 2017-07-04 RX ORDER — HYDROCODONE BITARTRATE AND ACETAMINOPHEN 5; 325 MG/1; MG/1
1 TABLET ORAL
Qty: 20 TAB | Refills: 0 | Status: SHIPPED | OUTPATIENT
Start: 2017-07-04 | End: 2017-08-17

## 2017-07-04 RX ADMIN — HYDROCODONE BITARTRATE AND ACETAMINOPHEN 1 TABLET: 10; 325 TABLET ORAL at 08:25

## 2017-07-04 RX ADMIN — HYDROCODONE BITARTRATE AND ACETAMINOPHEN 1 TABLET: 5; 325 TABLET ORAL at 20:38

## 2017-07-04 RX ADMIN — IBUPROFEN 400 MG: 400 TABLET ORAL at 14:30

## 2017-07-04 RX ADMIN — IBUPROFEN 400 MG: 400 TABLET ORAL at 20:38

## 2017-07-04 RX ADMIN — IBUPROFEN 400 MG: 400 TABLET ORAL at 04:02

## 2017-07-04 RX ADMIN — IBUPROFEN 400 MG: 400 TABLET ORAL at 08:25

## 2017-07-04 RX ADMIN — HYDROCODONE BITARTRATE AND ACETAMINOPHEN 1 TABLET: 5; 325 TABLET ORAL at 04:02

## 2017-07-04 NOTE — PROGRESS NOTES
Epidural cath pulled, tip intact  Iv hepwelled. Breast fed well for over an hour  In and out cath for 500 mls. Clear.

## 2017-07-04 NOTE — DISCHARGE INSTRUCTIONS
Vaginal Childbirth: Care Instructions  Your Care Instructions  Your body will slowly heal in the next few weeks. It is easy to get too tired and overwhelmed during the first weeks after your baby is born. Changes in your hormones can shift your mood without warning. You may find it hard to meet the extra demands on your energy and time. Take it easy on yourself. Follow-up care is a key part of your treatment and safety. Be sure to make and go to all appointments, and call your doctor if you are having problems. It's also a good idea to know your test results and keep a list of the medicines you take. How can you care for yourself at home? · Vaginal bleeding and cramps  ¨ After delivery, you will have a bloody discharge from the vagina. This will turn pink within a week and then white or yellow after about 10 days. It may last for 2 to 4 weeks or longer, until the uterus has healed. Use pads instead of tampons until you stop bleeding. ¨ Do not worry if you pass some blood clots, as long as they are smaller than a golf ball. If you have a tear or stitches in your vaginal area, change the pad at least every 4 hours to prevent soreness and infection. ¨ You may have cramps for the first few days after childbirth. These are normal and occur as the uterus shrinks to normal size. Take an over-the-counter pain medicine, such as acetaminophen (Tylenol), ibuprofen (Advil, Motrin), or naproxen (Aleve), for cramps. Read and follow all instructions on the label. Do not take aspirin, because it can cause more bleeding. ¨ Do not take two or more pain medicines at the same time unless the doctor told you to. Many pain medicines have acetaminophen, which is Tylenol. Too much acetaminophen (Tylenol) can be harmful. · Stitches  ¨ If you have stitches, they will dissolve on their own and do not need to be removed. Follow your doctor's instructions for cleaning the stitched area.   ¨ Put ice or a cold pack on your painful area for 10 to 20 minutes at a time, several times a day, for the first few days. Put a thin cloth between the ice and your skin. ¨ Sit in a few inches of warm water (sitz bath) 3 times a day and after bowel movements. The warm water helps with pain and itching. If you do not have a tub, a warm shower might help. · Breast fullness  ¨ Your breasts may overfill (engorge) in the first few days after delivery. To help milk flow and to relieve pain, warm your breasts in the shower or by using warm, moist towels before nursing. ¨ If you are not nursing, do not put warmth on your breasts or touch your breasts. Wear a tight bra or sports bra and use ice until the fullness goes away. This usually takes 2 to 3 days. ¨ Put ice or a cold pack on your breast after nursing to reduce swelling and pain. Put a thin cloth between the ice and your skin. · Activity  ¨ Eat a balanced diet. Do not try to lose weight by cutting calories. Keep taking your prenatal vitamins, or take a multivitamin. ¨ Get as much rest as you can. Try to take naps when your baby sleeps during the day. ¨ Get some exercise every day. But do not do any heavy exercise until your doctor says it is okay. ¨ Wait until you are healed (about 4 to 6 weeks) before you have sexual intercourse. Your doctor will tell you when it is okay to have sex. ¨ Talk to your doctor about birth control. You can get pregnant even before your period returns. Also, you can get pregnant while you are breastfeeding. · Mental health  ¨ It is normal to have some sadness, anxiety, sleeplessness, and mood swings after you go home. If you feel upset or hopeless for more than a few days or are having trouble doing the things you need to do, talk to your doctor. · Constipation and hemorrhoids  ¨ Drink plenty of fluids, enough so that your urine is light yellow or clear like water.  If you have kidney, heart, or liver disease and have to limit fluids, talk with your doctor before you increase the amount of fluids you drink. ¨ Eat plenty of fiber each day. Have a bran muffin or bran cereal for breakfast, and try eating a piece of fruit for a mid-afternoon snack. ¨ For painful, itchy hemorrhoids, put ice or a cold pack on the area several times a day for 10 minutes at a time. Follow this by putting a warm compress on the area for another 10 to 20 minutes or by sitting in a shallow, warm bath. When should you call for help? Call 911 anytime you think you may need emergency care. For example, call if:  · You are thinking of hurting yourself, your baby, or anyone else. · You have sudden, severe pain in your belly. · You passed out (lost consciousness). Call your doctor now or seek immediate medical care if:  · You have severe vaginal bleeding. · You are soaking through a pad each hour for 2 or more hours. · Your vaginal bleeding seems to be getting heavier or is still bright red 4 days after delivery. · You are dizzy or lightheaded, or you feel like you may faint. · You are vomiting or cannot keep fluids down. · You have a fever. · You have new or more belly pain. · You pass tissue (not just blood). · Your vaginal discharge smells bad. · Your belly feels tender or full and hard. · Your breasts are continuously painful or red. · You feel sad, anxious, or hopeless for more than a few days. Watch closely for changes in your health, and be sure to contact your doctor if you have any problems. Where can you learn more? Go to http://mariam-adonay.info/. Enter N449 in the search box to learn more about \"Vaginal Childbirth: Care Instructions. \"  Current as of: March 16, 2017  Content Version: 11.3  © 1761-5921 [employer]. Care instructions adapted under license by [employer] (which disclaims liability or warranty for this information).  If you have questions about a medical condition or this instruction, always ask your healthcare professional. [employer] disclaims any warranty or liability for your use of this information.

## 2017-07-04 NOTE — PROGRESS NOTES
Assisted pt out of bed to bathroom. Pt was able to void 800 ml clear yellow urine. Pericare taught and performed by patient. Clean gown, underwear, pad, and icepack placed. Assisted pt back to bed, pt tolerated well.

## 2017-07-04 NOTE — ANESTHESIA POSTPROCEDURE EVALUATION
Post-Anesthesia Evaluation and Assessment    Patient: Amy Mendez MRN: [id number]  SSN: [id number]    YOB: 1985  Age: 33 y.o. Sex: female       Cardiovascular Function/Vital Signs  Visit Vitals    /58 (BP 1 Location: Left arm, BP Patient Position: At rest)    Pulse 100    Temp 36.8 °C (98.3 °F)    Resp 16    Breastfeeding Unknown       Patient is status post epidural anesthesia for * No procedures listed *. Nausea/Vomiting: None    Postoperative hydration reviewed and adequate. Pain:  Pain Scale 1: Numeric (0 - 10) (07/04/17 0516)  Pain Intensity 1: 3 (07/04/17 0516)   Managed    Neurological Status:   Neuro (WDL): Within Defined Limits (07/03/17 2030)  Neuro  Neurologic State: Alert (07/03/17 2220)  Orientation Level: Oriented X4 (07/03/17 2220)  LUE Motor Response: Purposeful (07/03/17 2220)  LLE Motor Response: Tingling (07/03/17 2220)  RUE Motor Response: Purposeful (07/03/17 2220)  RLE Motor Response: Tingling (07/03/17 2220)   At baseline    Mental Status and Level of Consciousness: Arousable    Pulmonary Status:   O2 Device: Room air (07/04/17 0020)   Adequate oxygenation and airway patent    Complications related to anesthesia: None    Post-anesthesia assessment completed.  No concerns    Signed By: Alexander Guevara MD     July 4, 2017

## 2017-07-04 NOTE — PROGRESS NOTES
2010- srom clear fluid  2015- dr thakur at , set up for delivery  2020- baby girl, nuchal cord x1, s2s with mom  2024- placenta, pitocin infusing per protocol

## 2017-07-04 NOTE — PROGRESS NOTES
ctsp secondary to C/c/+2 will start pushing-  Patient Vitals for the past 4 hrs: Mode Fetal Heart Rate Fetal Activity Variability Decelerations Accelerations RN Reviewed Strip? Non Stress Test   07/03/17 2002 External 150 Present 6-25 BPM Variable No Yes -   07/03/17 1945 External 140 Present (!) Less than or equal to 5 BPM Variable No Yes -   07/03/17 1930 External 140 Present 6-25 BPM None Yes Yes Reactive   07/03/17 1915 External 140 Present 6-25 BPM None Yes Yes Reactive   07/03/17 1901 External 140 Present 6-25 BPM Early No Yes -   07/03/17 1845 External 140 Present 6-25 BPM Early No Yes -   07/03/17 1825 External 140 Present 6-25 BPM None No Yes -   07/03/17 1815 External 130 Present 6-25 BPM None No Yes -   07/03/17 1801 External 110 - 6-25 BPM - - - -   07/03/17 1730 External 145 Present (!) Less than or equal to 5 BPM Variable No Yes -   07/03/17 1614 External 130 Present 6-25 BPM None Yes Yes Reactive     No data found. No data found. No data found.

## 2017-07-04 NOTE — ROUTINE PROCESS
SBAR IN Report: Mother    Verbal report received from Beryl Catalan RN on this patient, who is now being transferred from L&D for routine progression of care. The patient is not wearing a green \"Anesthesia-Duramorph\" band. Report consisted of patient's Situation, Background, Assessment and Recommendations (SBAR). [place] ID bands were compared with the identification form, and verified with the patient and transferring nurse. Information from the SBAR, Kardex, Procedure Summary, Intake/Output and MAR and the [place] Report was reviewed with the transferring nurse; opportunity for questions and clarification provided.

## 2017-07-04 NOTE — PROGRESS NOTES
Post-Partum Day Number 1 Progress Note    Patient doing well post-partum without significant complaint. Voiding withour difficulty, normal lochia. Nipples sore  Vitals:  Patient Vitals for the past 8 hrs:   BP Temp Pulse Resp   17 0810 99/58 97.8 °F (36.6 °C) 71 18     Temp (24hrs), Av.3 °F (36.8 °C), Min:97.8 °F (36.6 °C), Max:98.7 °F (37.1 °C)      Vital signs stable, afebrile. Exam:  Patient without distress. Abdomen soft, fundus firm at level of umbilicus, nontender               Perineum with normal lochia noted. Lower extremities are negative for swelling, cords or tenderness. Lab/Data Review: All lab results for the last 24 hours reviewed. Assessment and Plan:  Patient appears to be having uncomplicated post-partum course. Continue routine perineal care and maternal education.   Patient requests early discharge and [place] Nipple Cream.

## 2017-07-04 NOTE — PROGRESS NOTES
Discharge teaching complete, paperwork signed, prescriptions given, questions encouraged, verbalized understanding.

## 2017-07-04 NOTE — PROGRESS NOTES
Report of care received from, Go Smith RN.  Bedside report given, pt denies further needs at present time

## 2017-07-04 NOTE — L&D DELIVERY NOTE
Delivery Summary    Patient: Sharmaine Dave MRN: [id number]  SSN: [id number]    YOB: 1985  Age: 33 y.o. Sex: female        Labor Events:    Labor: No    Rupture Date:      Rupture Time:      Rupture Type SROM    Amniotic Fluid Volume:      Amniotic Fluid Description: Clear  None    Induction: None        Augmentation: None    Labor Complications: None     Additional Complications:        Cervical Ripening:       None      Delivery Events:  Episiotomy: None    Laceration(s): None      Repaired: None     Number of Repair Packets: 0    Suture Type and Size:         Estimated Blood Loss (ml): 300        Information for the patient's :  Akil Louis [[id number]]     Delivery Summary - Baby    Delivery Date: 7/3/2017   Delivery Time: 8:20 PM   Delivery Type: Vaginal, Spontaneous Delivery  Sex:  female  Gestational Age: 44w3d  Delivery Clinician:  Ngozi Umaña  Living?: Living   Delivery Location: [facility]D 432           APGARS  One minute Five minutes Ten minutes   Skin Color: 1    1       Heart Rate: 2   2         Reflex Irritability: 2   2         Muscle Tone: 2   2       Respiration: 2   2         Total: 9   9           Presentation: Vertex  Position: Left Occiput Anterior  Resuscitation Method:  Tactile Stimulation;Suctioning-bulb     Meconium Stained: None    Cord Information: 3 Vessels   Complications: None  Cord Blood Sent?:  Yes    Blood Gases Sent?:  Yes    Placenta:  Date/Time: 7/3  6:24 PM  Removal: Spontaneous      Appearance: Normal;Intact     [place] Measurements:  Birth Weight:      Birth Length:     Head Circumference:       Chest Circumference:      Abdominal Girth: Other Providers:   JAVY TIRADO;MOMO DEMPSEY;CULLEN ROMERO;MILENA VEGA;DEIDRA FERNANDES; Obstetrician;Primary Nurse;Primary  Nurse; Anesthesiologist;Crna;Scrub Tech           Cord Blood Results:  Information for the patient's :  Akil Louis [[id number]]   No results found for: Lamond Carballo, PCTDIG, BILI, ABORHEXT, [address]    Information for the patient's :  Crystal Rola [[id number]]   No results found for: APH, APCO2, APO2, AHCO3, ABEC, ABDC, O2ST, SITE, [place], PHI, Rupal, PO2I, HCO3I, SO2I, IBD     Information for the patient's :  Crystal Walshs [[id number]]   No results found for: EPHV, PCO2V, PO2V, HCO3V, O2STV, EBDV   over intact perineum, tight nuchal cord, cord clamped and cut at perineum. Infant delivered, no shoulder dystocia. Spontaneous delivery of placenta. Excellent hemostasis.

## 2017-07-05 NOTE — PROGRESS NOTES
Patient to home per MD order. Patient left unit via wheelchair with family and  in car seat. [facility] staff escorted patient and  off the unit to their personal automobile. Pt in stable condition.

## 2017-08-21 ENCOUNTER — TELEPHONE (OUTPATIENT)
Dept: CASE MANAGEMENT | Age: 32
End: 2017-08-21

## 2017-08-21 NOTE — TELEPHONE ENCOUNTER
Phone call to patient at [phone]. Patient confirms that she's been dealing with postpartum anxiety since delivering baby on 7/3/17. Patient states that she may have experienced anxiety \"a little bit\" with her first child. Per patient, she's feeling better since starting the Zoloft last week. Emotional support offered. Patient is receptive to  mailing informational packet on postpartum depression. Address confirmed.       Tenzin Lee, [address] [facility]

## 2017-09-06 PROBLEM — F41.8 POSTPARTUM ANXIETY: Status: ACTIVE | Noted: 2017-09-06

## 2017-10-02 ENCOUNTER — TELEPHONE (OUTPATIENT)
Dept: CASE MANAGEMENT | Age: 32
End: 2017-10-02

## 2017-10-02 NOTE — TELEPHONE ENCOUNTER
Phone call to patient at [phone] to [facility] in. \"  No answer; unable to leave message.     Deangelo Saunders, [address] [facility]

## 2017-10-18 ENCOUNTER — TELEPHONE (OUTPATIENT)
Dept: CASE MANAGEMENT | Age: 32
End: 2017-10-18

## 2017-10-18 NOTE — TELEPHONE ENCOUNTER
Phone call to patient at [phone] to [facility] in. \"  No answer; unable to leave message.     Jayda Bright, [address] [facility]

## 2017-12-14 ENCOUNTER — HOSPITAL ENCOUNTER (OUTPATIENT)
Dept: MRI IMAGING | Age: 32
Discharge: HOME | End: 2017-12-14
Payer: COMMERCIAL

## 2017-12-14 DIAGNOSIS — D32.9 MENINGIOMA (HCC): ICD-10-CM

## 2017-12-14 PROCEDURE — A9577 INJ MULTIHANCE: HCPCS

## 2017-12-14 PROCEDURE — 70553 MRI BRAIN STEM W/O & W/DYE: CPT

## 2017-12-14 PROCEDURE — 74011250636 HC RX REV CODE- 250/636

## 2017-12-14 RX ORDER — SODIUM CHLORIDE 0.9 % (FLUSH) 0.9 %
10 SYRINGE (ML) INJECTION
Status: COMPLETED | OUTPATIENT
Start: 2017-12-14 | End: 2017-12-14

## 2017-12-14 RX ADMIN — GADOBENATE DIMEGLUMINE 14 ML: 529 INJECTION, SOLUTION INTRAVENOUS at 18:36

## 2017-12-14 RX ADMIN — Medication 10 ML: at 18:36

## 2018-01-25 ENCOUNTER — TELEPHONE (OUTPATIENT)
Dept: CASE MANAGEMENT | Age: 33
End: 2018-01-25

## 2018-01-25 NOTE — TELEPHONE ENCOUNTER
Phone call to patient at [phone] to [facility] in.\"  No answer; unable to leave message.  Gerber Arce, [address] [facility]

## 2018-06-03 NOTE — IP AVS SNAPSHOT
Current Discharge Medication List  
  
START taking these medications Dose & Instructions Dispensing Information Comments Morning Noon Evening Bedtime HYDROcodone-acetaminophen 5-325 mg per tablet Commonly known as:  [place] Your last dose was: Your next dose is:    
   
   
 Dose:  1 Tab Take 1 Tab by mouth every four (4) hours as needed. Max Daily Amount: 6 Tabs. Quantity:  20 Tab Refills:  0  
     
   
   
   
  
 ibuprofen 800 mg tablet Commonly known as:  MOTRIN Your last dose was: Your next dose is:    
   
   
 Dose:  800 mg Take 1 Tab by mouth every eight (8) hours as needed. Quantity:  90 Tab Refills:  0  
     
   
   
   
  
 WINN'S NIPPLE OINTMENT AMB ONLY Your last dose was: Your next dose is:    
   
   
 Apply  to affected area three (3) times daily. Quantity:  30 g Refills:  3 CONTINUE these medications which have NOT CHANGED Dose & Instructions Dispensing Information Comments Morning Noon Evening Bedtime  
 pramoxine-hydrocortisone 1-1 % rectal cream  
Commonly known as:  ANALPRAM HC 1% Your last dose was: Your next dose is: Insert  into rectum two (2) times a day. Quantity:  30 g Refills:  3 PRENATAL DHA+COMPLETE PRENATAL -300 mg-mcg-mg Cmpk Generic drug:  [id number] bro-folic-dha Your last dose was: Your next dose is: Take  by mouth. Refills:  0 STOP taking these medications   
 butalbital-acetaminophen-caffeine -40 mg per tablet Commonly known as:  FIORICET, ESGIC  
   
  
 folic acid 1 mg tablet Commonly known as:  FOLVITE  
   
  
 TYLENOL 325 mg tablet Generic drug:  acetaminophen VITAMIN D3 1,000 unit tablet Generic drug:  cholecalciferol Where to Get Your Medications These medications were sent to [facility] [address], [address]  [address], [address] [place] [address] [address] [place] [address] Phone:  [phone]  
  ibuprofen 800 mg tablet Information on where to get these meds will be given to you by the nurse or doctor. ! Ask your nurse or doctor about these medications HYDROcodone-acetaminophen 5-325 mg per tablet WINN'S NIPPLE OINTMENT AMB ONLY Not on home diabetes medication. A1c 6.1  -FS have been stable, d/c finger sticks

## 2022-03-18 PROBLEM — Z37.9 NORMAL LABOR: Status: ACTIVE | Noted: 2017-07-03

## 2022-03-19 PROBLEM — F41.8 POSTPARTUM ANXIETY: Status: ACTIVE | Noted: 2017-09-06

## 2022-05-19 DIAGNOSIS — Z13.0 SCREENING, IRON DEFICIENCY ANEMIA: ICD-10-CM

## 2022-05-19 DIAGNOSIS — O99.013 ANEMIA DURING PREGNANCY IN THIRD TRIMESTER: Primary | ICD-10-CM

## 2022-05-25 ENCOUNTER — OFFICE VISIT (OUTPATIENT)
Dept: ONCOLOGY | Age: 37
End: 2022-05-25
Payer: COMMERCIAL

## 2022-05-25 ENCOUNTER — HOSPITAL ENCOUNTER (OUTPATIENT)
Dept: LAB | Age: 37
Discharge: HOME | End: 2022-05-28
Payer: COMMERCIAL

## 2022-05-25 VITALS
RESPIRATION RATE: 19 BRPM | BODY MASS INDEX: 30.7 KG/M2 | SYSTOLIC BLOOD PRESSURE: 97 MMHG | OXYGEN SATURATION: 97 % | HEART RATE: 100 BPM | HEIGHT: 66 IN | DIASTOLIC BLOOD PRESSURE: 64 MMHG | WEIGHT: 191 LBS

## 2022-05-25 DIAGNOSIS — R00.0 TACHYCARDIA: ICD-10-CM

## 2022-05-25 DIAGNOSIS — O99.013 ANEMIA DURING PREGNANCY IN THIRD TRIMESTER: ICD-10-CM

## 2022-05-25 DIAGNOSIS — Z13.0 SCREENING, IRON DEFICIENCY ANEMIA: ICD-10-CM

## 2022-05-25 DIAGNOSIS — D50.9 IRON DEFICIENCY ANEMIA, UNSPECIFIED IRON DEFICIENCY ANEMIA TYPE: Primary | ICD-10-CM

## 2022-05-25 DIAGNOSIS — N92.0 MENORRHAGIA WITH REGULAR CYCLE: ICD-10-CM

## 2022-05-25 DIAGNOSIS — R53.83 OTHER FATIGUE: ICD-10-CM

## 2022-05-25 LAB
ALBUMIN SERPL-MCNC: 2.6 G/DL (ref 3.5–5)
ALBUMIN/GLOB SERPL: 0.7 {RATIO} (ref 1.2–3.5)
ALP SERPL-CCNC: 85 U/L (ref 50–136)
ALT SERPL-CCNC: 14 U/L (ref 12–65)
ANION GAP SERPL CALC-SCNC: 7 MMOL/L (ref 7–16)
APPEARANCE UR: ABNORMAL
AST SERPL-CCNC: 15 U/L (ref 15–37)
BACTERIA URNS QL MICRO: ABNORMAL /HPF
BASOPHILS # BLD: 0 K/UL (ref 0–0.2)
BASOPHILS NFR BLD: 0 % (ref 0–2)
BILIRUB SERPL-MCNC: 0.2 MG/DL (ref 0.2–1.1)
BILIRUB UR QL: NEGATIVE
BUN SERPL-MCNC: 7 MG/DL (ref 6–23)
CALCIUM SERPL-MCNC: 8.8 MG/DL (ref 8.3–10.4)
CASTS URNS QL MICRO: 0 /LPF
CHLORIDE SERPL-SCNC: 109 MMOL/L (ref 98–107)
CO2 SERPL-SCNC: 24 MMOL/L (ref 21–32)
COLOR UR: YELLOW
CREAT SERPL-MCNC: 0.6 MG/DL (ref 0.6–1)
CRYSTALS URNS QL MICRO: 0 /LPF
DAT POLY-SP REAG RBC QL: NORMAL
DIFFERENTIAL METHOD BLD: ABNORMAL
EOSINOPHIL # BLD: 0.1 K/UL (ref 0–0.8)
EOSINOPHIL NFR BLD: 1 % (ref 0.5–7.8)
EPI CELLS #/AREA URNS HPF: ABNORMAL /HPF
ERYTHROCYTE [DISTWIDTH] IN BLOOD BY AUTOMATED COUNT: 15.2 % (ref 11.9–14.6)
ERYTHROCYTE [SEDIMENTATION RATE] IN BLOOD: 35 MM/HR (ref 0–20)
FERRITIN SERPL-MCNC: 9 NG/ML (ref 8–388)
FOLATE SERPL-MCNC: 39.5 NG/ML (ref 3.1–17.5)
GLOBULIN SER CALC-MCNC: 3.9 G/DL (ref 2.3–3.5)
GLUCOSE SERPL-MCNC: 100 MG/DL (ref 65–100)
GLUCOSE UR STRIP.AUTO-MCNC: NEGATIVE MG/DL
HCT VFR BLD AUTO: 31.7 % (ref 35.8–46.3)
HGB BLD-MCNC: 10.1 G/DL (ref 11.7–15.4)
HGB RETIC QN AUTO: 26 PG (ref 29–35)
HGB UR QL STRIP: NEGATIVE
IMM GRANULOCYTES # BLD AUTO: 0 K/UL (ref 0–0.5)
IMM GRANULOCYTES NFR BLD AUTO: 0 % (ref 0–5)
IMM RETICS NFR: 28.1 % (ref 3–15.9)
IRON SATN MFR SERPL: 11 %
IRON SERPL-MCNC: 64 UG/DL (ref 35–150)
KETONES UR QL STRIP.AUTO: NEGATIVE MG/DL
LDH SERPL L TO P-CCNC: 145 U/L (ref 100–190)
LEUKOCYTE ESTERASE UR QL STRIP.AUTO: ABNORMAL
LYMPHOCYTES # BLD: 0.8 K/UL (ref 0.5–4.6)
LYMPHOCYTES NFR BLD: 10 % (ref 13–44)
MCH RBC QN AUTO: 28.4 PG (ref 26.1–32.9)
MCHC RBC AUTO-ENTMCNC: 31.9 G/DL (ref 31.4–35)
MCV RBC AUTO: 89 FL (ref 79.6–97.8)
MONOCYTES # BLD: 0.6 K/UL (ref 0.1–1.3)
MONOCYTES NFR BLD: 8 % (ref 4–12)
MUCOUS THREADS URNS QL MICRO: 0 /LPF
NEUTS SEG # BLD: 6.5 K/UL (ref 1.7–8.2)
NEUTS SEG NFR BLD: 80 % (ref 43–78)
NITRITE UR QL STRIP.AUTO: NEGATIVE
NRBC # BLD: 0 K/UL (ref 0–0.2)
PH UR STRIP: 7 [PH] (ref 5–9)
PHOSPHATE SERPL-MCNC: 2.6 MG/DL (ref 2.5–4.5)
PLATELET # BLD AUTO: 151 K/UL (ref 150–450)
PMV BLD AUTO: 10.4 FL (ref 9.4–12.3)
POTASSIUM SERPL-SCNC: 4.2 MMOL/L (ref 3.5–5.1)
PROT SERPL-MCNC: 6.5 G/DL (ref 6.3–8.2)
PROT UR STRIP-MCNC: NEGATIVE MG/DL
RBC # BLD AUTO: 3.56 M/UL (ref 4.05–5.2)
RBC #/AREA URNS HPF: ABNORMAL /HPF
RETICS # AUTO: 0.08 M/UL (ref 0.03–0.1)
RETICS/RBC NFR AUTO: 2.4 % (ref 0.3–2)
SODIUM SERPL-SCNC: 140 MMOL/L (ref 136–145)
SP GR UR REFRACTOMETRY: 1.01 (ref 1–1.02)
T4 FREE SERPL-MCNC: 0.8 NG/DL (ref 0.78–1.4)
TIBC SERPL-MCNC: 557 UG/DL (ref 250–450)
URATE SERPL-MCNC: 4.1 MG/DL (ref 2.6–6)
UROBILINOGEN UR QL STRIP.AUTO: 0.2 EU/DL (ref 0.2–1)
VIT B12 SERPL-MCNC: 192 PG/ML (ref 193–986)
WBC # BLD AUTO: 8.1 K/UL (ref 4.3–11.1)
WBC URNS QL MICRO: ABNORMAL /HPF

## 2022-05-25 PROCEDURE — 99205 OFFICE O/P NEW HI 60 MIN: CPT | Performed by: PEDIATRICS

## 2022-05-25 PROCEDURE — 80053 COMPREHEN METABOLIC PANEL: CPT

## 2022-05-25 PROCEDURE — 84550 ASSAY OF BLOOD/URIC ACID: CPT

## 2022-05-25 PROCEDURE — 85652 RBC SED RATE AUTOMATED: CPT

## 2022-05-25 PROCEDURE — 84238 ASSAY NONENDOCRINE RECEPTOR: CPT

## 2022-05-25 PROCEDURE — 86880 COOMBS TEST DIRECT: CPT

## 2022-05-25 PROCEDURE — 84100 ASSAY OF PHOSPHORUS: CPT

## 2022-05-25 PROCEDURE — 83540 ASSAY OF IRON: CPT

## 2022-05-25 PROCEDURE — 84439 ASSAY OF FREE THYROXINE: CPT

## 2022-05-25 PROCEDURE — 81015 MICROSCOPIC EXAM OF URINE: CPT

## 2022-05-25 PROCEDURE — 85025 COMPLETE CBC W/AUTO DIFF WBC: CPT

## 2022-05-25 PROCEDURE — 82607 VITAMIN B-12: CPT

## 2022-05-25 PROCEDURE — 86038 ANTINUCLEAR ANTIBODIES: CPT

## 2022-05-25 PROCEDURE — 81003 URINALYSIS AUTO W/O SCOPE: CPT

## 2022-05-25 PROCEDURE — 85046 RETICYTE/HGB CONCENTRATE: CPT

## 2022-05-25 PROCEDURE — 82746 ASSAY OF FOLIC ACID SERUM: CPT

## 2022-05-25 PROCEDURE — 86235 NUCLEAR ANTIGEN ANTIBODY: CPT

## 2022-05-25 PROCEDURE — 82728 ASSAY OF FERRITIN: CPT

## 2022-05-25 PROCEDURE — 36415 COLL VENOUS BLD VENIPUNCTURE: CPT

## 2022-05-25 PROCEDURE — 83615 LACTATE (LD) (LDH) ENZYME: CPT

## 2022-05-25 RX ORDER — SODIUM CHLORIDE 0.9 % (FLUSH) 0.9 %
5-40 SYRINGE (ML) INJECTION PRN
Status: CANCELLED | OUTPATIENT
Start: 2022-06-01

## 2022-05-25 RX ORDER — SODIUM CHLORIDE 9 MG/ML
INJECTION, SOLUTION INTRAVENOUS CONTINUOUS
Status: CANCELLED | OUTPATIENT
Start: 2022-06-01

## 2022-05-25 RX ORDER — CYCLOBENZAPRINE HCL 10 MG
10 TABLET ORAL 3 TIMES DAILY PRN
COMMUNITY

## 2022-05-25 RX ORDER — ACETAMINOPHEN 325 MG/1
650 TABLET ORAL
Status: CANCELLED | OUTPATIENT
Start: 2022-06-01

## 2022-05-25 RX ORDER — EPINEPHRINE 1 MG/ML
0.3 INJECTION, SOLUTION, CONCENTRATE INTRAVENOUS PRN
Status: CANCELLED | OUTPATIENT
Start: 2022-06-01

## 2022-05-25 RX ORDER — ONDANSETRON 2 MG/ML
8 INJECTION INTRAMUSCULAR; INTRAVENOUS
Status: CANCELLED | OUTPATIENT
Start: 2022-06-01

## 2022-05-25 RX ORDER — DIPHENHYDRAMINE HYDROCHLORIDE 50 MG/ML
50 INJECTION INTRAMUSCULAR; INTRAVENOUS
Status: CANCELLED | OUTPATIENT
Start: 2022-06-01

## 2022-05-25 RX ORDER — SODIUM CHLORIDE 9 MG/ML
5-250 INJECTION, SOLUTION INTRAVENOUS PRN
Status: CANCELLED | OUTPATIENT
Start: 2022-06-01

## 2022-05-25 RX ORDER — FAMOTIDINE 10 MG/ML
20 INJECTION, SOLUTION INTRAVENOUS
Status: CANCELLED | OUTPATIENT
Start: 2022-06-01

## 2022-05-25 RX ORDER — ALBUTEROL SULFATE 90 UG/1
4 AEROSOL, METERED RESPIRATORY (INHALATION) PRN
Status: CANCELLED | OUTPATIENT
Start: 2022-06-01

## 2022-05-25 ASSESSMENT — PATIENT HEALTH QUESTIONNAIRE - PHQ9
SUM OF ALL RESPONSES TO PHQ QUESTIONS 1-9: 0
SUM OF ALL RESPONSES TO PHQ9 QUESTIONS 1 & 2: 0
SUM OF ALL RESPONSES TO PHQ QUESTIONS 1-9: 0
2. FEELING DOWN, DEPRESSED OR HOPELESS: 0
SUM OF ALL RESPONSES TO PHQ QUESTIONS 1-9: 0
1. LITTLE INTEREST OR PLEASURE IN DOING THINGS: 0
SUM OF ALL RESPONSES TO PHQ QUESTIONS 1-9: 0

## 2022-05-25 NOTE — LETTER
[facility] HEMATOLOGY AND ONCOLOGY  [address] [place] [address] Way [address]  Phone: [phone]  Fax: [phone]           Cristofer Morales MD, MD      Thank you for referring Lupe Hernandez to the Adolescent Young Adult Hematology Oncology clinic at [address] [place] [address]. Please see the attached clinic note for details of Iron Carroll's assessment and plan.     Please don't hesitate to contact us with any questions and thank you again for the referral.    Best Regards,

## 2022-05-25 NOTE — PATIENT INSTRUCTIONS
Patient Instructions from Today's Visit    Reason for Visit:  New patient visit for anemia in pregnancy (30 weeks)  Followed by Heather Griffin  Symptoms: fatigue and tachycardic    Hx:   -Heavy menses with clots (Prior to current pregnancy)  -D&C and blood transfusion (feb 2021)  -pt historically exclusively breastfeeds for 1 year with baby led feeding    Plan: Your hemoglobin and iron stores are low. Discussed oral vs IV iron options. We will set you up with IV Iron per our discussions and your agreement. The product is called Injectafer. You will get 2 doses  by 1 week. You do not need to take oral iron since you are getting IV Iron    Follow Up:  ~ 4 months after delivery so in about 6 months    Recent Lab Results:  Hospital Outpatient Visit on 05/25/2022   Component Date Value Ref Range Status    T4 Free 05/25/2022 0.8  0.78 - 1.4 NG/DL Final    Color, UA 05/25/2022 YELLOW    Final    Appearance 05/25/2022 CLOUDY    Final    Specific Gravity, UA 05/25/2022 1.010  1.001 - 1.023   Final    pH, Urine 05/25/2022 7.0  5.0 - 9.0   Final    Protein, UA 05/25/2022 Negative  NEG mg/dL Final    Glucose, UA 05/25/2022 Negative  NEG mg/dL Final    Ketones, Urine 05/25/2022 Negative  NEG mg/dL Final    Bilirubin Urine 05/25/2022 Negative  NEG   Final    Blood, Urine 05/25/2022 Negative  NEG   Final    Urobilinogen, Urine 05/25/2022 0.2  0.2 - 1.0 EU/dL Final    Nitrite, Urine 05/25/2022 Negative  NEG   Final    Leukocyte Esterase, Urine 05/25/2022 MODERATE* NEG   Final    Uric Acid 05/25/2022 4.1  2.6 - 6.0 MG/DL Final    LD 05/25/2022 145  100 - 190 U/L Final    Phosphorus 05/25/2022 2.6  2.5 - 4.5 MG/DL Final    Ferritin 05/25/2022 9  8 - 388 NG/ML Final    Iron 05/25/2022 64  35 - 150 ug/dL Final    Comment: Known Interfering Substances section:  \"Iron values may be falsely elevated in  serum samples from patients with  anticoagulants (e.g., hemodialysis patients). \"  Limitations of Procedure section:  \"Turbidity resulting from precipitation of  fibrinogen in the serum of patients treated  with anticoagulants (e.g. hemodialysis  patients) may cause spuriously elevated  iron results. \"      TIBC 05/25/2022 557* 250 - 450 ug/dL Final    TRANSFERRIN SATURATION 05/25/2022 11* >20 % Final    Reticulocyte Count,Automated 05/25/2022 2.4* 0.3 - 2.0 % Final    Absolute Retic # 05/25/2022 0.0844  0.026 - 0.095 M/ul Final    Immature Retic Fraction 05/25/2022 28.1* 3.0 - 15.9 % Final    Retic Hemoglobin conc. 05/25/2022 26* 29 - 35 pg Final    Sodium 05/25/2022 140  136 - 145 mmol/L Final    Potassium 05/25/2022 4.2  3.5 - 5.1 mmol/L Final    Chloride 05/25/2022 109* 98 - 107 mmol/L Final    CO2 05/25/2022 24  21 - 32 mmol/L Final    Anion Gap 05/25/2022 7  7 - 16 mmol/L Final    Glucose 05/25/2022 100  65 - 100 mg/dL Final    BUN 05/25/2022 7  6 - 23 MG/DL Final    CREATININE 05/25/2022 0.60  0.6 - 1.0 MG/DL Final    GFR  05/25/2022 >60  >60 ml/min/1.73m2 Final    GFR Non- 05/25/2022 >60  >60 ml/min/1.73m2 Final    Comment:      Estimated GFR is calculated using the Modification of Diet in Renal Disease (MDRD) Study equation, reported for both  Americans (GFRAA) and non- Americans (GFRNA), and normalized to 1.73m2 body surface area. The physician must decide which value applies to the patient. The MDRD study equation should only be used in individuals age 25 or older. It has not been validated for the following: pregnant women, patients with serious comorbid conditions,or on certain medications, or persons with extremes of body size, muscle mass, or nutritional status.       Calcium 05/25/2022 8.8  8.3 - 10.4 MG/DL Final    Total Bilirubin 05/25/2022 0.2  0.2 - 1.1 MG/DL Final    ALT 05/25/2022 14  12 - 65 U/L Final    AST 05/25/2022 15  15 - 37 U/L Final    Alk Phosphatase 05/25/2022 85  50 - 136 U/L Final    Total Protein 05/25/2022 6.5 6.3 - 8.2 g/dL Final    Albumin 05/25/2022 2.6* 3.5 - 5.0 g/dL Final    Globulin 05/25/2022 3.9* 2.3 - 3.5 g/dL Final    Albumin/Globulin Ratio 05/25/2022 0.7* 1.2 - 3.5   Final    WBC 05/25/2022 8.1  4.3 - 11.1 K/uL Final    RBC 05/25/2022 3.56* 4.05 - 5.2 M/uL Final    Hemoglobin 05/25/2022 10.1* 11.7 - 15.4 g/dL Final    Hematocrit 05/25/2022 31.7* 35.8 - 46.3 % Final    MCV 05/25/2022 89.0  79.6 - 97.8 FL Final    MCH 05/25/2022 28.4  26.1 - 32.9 PG Final    MCHC 05/25/2022 31.9  31.4 - 35.0 g/dL Final    RDW 05/25/2022 15.2* 11.9 - 14.6 % Final    Platelets 98/08/0857 151  150 - 450 K/uL Final    MPV 05/25/2022 10.4  9.4 - 12.3 FL Final    nRBC 05/25/2022 0.00  0.0 - 0.2 K/uL Final    **Note: Absolute NRBC parameter is now reported with Hemogram**    Differential Type 05/25/2022 AUTOMATED    Final    Seg Neutrophils 05/25/2022 80* 43 - 78 % Final    Lymphocytes 05/25/2022 10* 13 - 44 % Final    Monocytes 05/25/2022 8  4.0 - 12.0 % Final    Eosinophils % 05/25/2022 1  0.5 - 7.8 % Final    Basophils 05/25/2022 0  0.0 - 2.0 % Final    Immature Granulocytes 05/25/2022 0  0.0 - 5.0 % Final    Segs Absolute 05/25/2022 6.5  1.7 - 8.2 K/UL Final    Absolute Lymph # 05/25/2022 0.8  0.5 - 4.6 K/UL Final    Absolute Mono # 05/25/2022 0.6  0.1 - 1.3 K/UL Final    Absolute Eos # 05/25/2022 0.1  0.0 - 0.8 K/UL Final    Basophils Absolute 05/25/2022 0.0  0.0 - 0.2 K/UL Final    Absolute Immature Granulocyte 05/25/2022 0.0  0.0 - 0.5 K/UL Final    WBC, UA 05/25/2022 10-20  0 /hpf Final    RBC, UA 05/25/2022 0-3  0 /hpf Final    Epithelial Cells UA 05/25/2022 20-50  0 /hpf Final    BACTERIA, URINE 05/25/2022 3+* 0 /hpf Final    Casts 05/25/2022 0  0 /lpf Final    Crystals 05/25/2022 0  0 /LPF Final    Mucus, UA 05/25/2022 0  0 /lpf Final   (    Treatment Summary has been discussed and given to patient: NA        -------------------------------------------------------------------------------------------------------------------  Please call our office at [phone] if you have any  of the following symptoms:   · Fever of 100.5 or greater  · Chills  · Shortness of breath  · Swelling or pain in one leg    After office hours an answering service is available and will contact a provider for emergencies or if you are experiencing any of the above symptoms.  Patient has My Chart. My Chart log in information explained on the after visit summary printout at the [employer] desk. Patient Education     ferric carboxymaltose  Pronunciation: KATY ik femi BOX ee MAWL tose  Brand: Injectafer  What is the most important information I should know about ferric carboxymaltose? Use only as directed. Tell your doctor if you use other medicines or have othermedical conditions or allergies. What is ferric carboxymaltose? Ferric carboxymaltose is an iron replacement product that is used in adults used to treat iron deficiency anemia (TRAVIS), which is low red blood cells causedby a lack of iron in the body. Ferric carboxymaltose is given to adults with TRAVIS and chronic kidney disease (not on dialysis), or to adults with TRAVIS when iron taken by mouth is noteffective. Ferric carboxymaltose may also be used for purposes not listed in thismedication guide. What should I discuss with my healthcare provider before receiving ferric carboxymaltose? You should not use ferric carboxymaltose if you are allergic to it. Tell your doctor if you have ever had:  high blood pressure; or  an allergic reaction to iron injected into a vein. Tell your doctor if you are pregnant or plan to become pregnant. It is not known if ferric carboxymaltose will harm an unborn baby, but this medicine may cause severe reactions in the mother that could affect the baby'sheartbeat.   Having iron deficiency anemia during pregnancy may increase the risk of premature birth or low birth weight. The benefit of treating this condition with ferric carboxymaltose may outweigh any risks to the baby. If you are breastfeeding, tell your doctor if you notice diarrhea orconstipation in the nursing baby. How is ferric carboxymaltose given? Ferric carboxymaltose is injected into a vein by a healthcare provider. Tell your medical caregivers if you feel any burning or pain when ferriccarboxymaltose is injected. You will be watched for at least 30 minutes to make sure you do not have anallergic reaction. This medicine is usually given in 2 doses, 7 days apart. You may need frequent medical tests, even if you have no symptoms. What happens if I miss a dose? Call your doctor for instructions if you miss an appointment for your ferriccarboxymaltose injection. What happens if I overdose? Seek emergency medical attention or call the Poison Help line at [phone]. Overdose symptoms may include pain, cough, wheezing, shortness of breath,coughing up blood, weight loss, or slowed growth in a child. What should I avoid after receiving ferric carboxymaltose? Do not take iron supplements or a vitamin/mineral supplement that your doctorhas not prescribed or recommended. What are the possible side effects of ferric carboxymaltose? Get emergency medical help if you have signs of an allergic reaction: hives; feeling like you might pass out; wheezing, difficult breathing;swelling of your face, lips, tongue, or throat.   Call your doctor at once if you have:  increased blood pressure --dizziness, nausea, sudden warmth or redness in your face, severe headache, pounding in your neck or ears;  low levels of phosphorus in your blood --confusion, bone pain, muscle weakness; or  high levels of iron stored in your body --feeling weak or tired, joint pain, finger pain, stomach pain, weight loss, irregular heartbeats, fluttering in your chest.  Common side effects may include:  nausea;  dizziness;  high blood pressure;  flushing (warmth, redness, or tingly feeling); or  low phosphorus levels. This is not a complete list of side effects and others may occur. Call your doctor for medical advice about side effects. You may report side effects toFDA at [phone]. What other drugs will affect ferric carboxymaltose? Other drugs may affect ferric carboxymaltose, including prescription and over-the-counter medicines, vitamins, and herbal products. Tell your doctorabout all other medicines you use. Where can I get more information? Your pharmacist can provide more information about ferric carboxymaltose. Remember, keep this and all other medicines out of the reach of children, never share your medicines with others, and use this medication only for the indication prescribed. Every effort has been made to ensure that the information provided by [address] is accurate, up-to-date, and complete, but no guarantee is made to that effect. Drug information contained herein may be time sensitive. [employer] information has been compiled for use by healthcare practitioners and consumers in the [place] and therefore [employer] does not warrant that uses outside of the [place] are appropriate, unless specifically indicated otherwise. [facility]'s drug information does not endorse drugs, diagnose patients or recommend therapy. [facility] drug information is an informational resource designed to assist licensed healthcare practitioners in caring for their patients and/or to serve consumers viewing this service as a supplement to, and not a substitute for, the expertise, skill, knowledge and judgment of healthcare practitioners. The absence of a warning for a given drug or drug combination in no way should be construed to indicate that the drug or drug combination is safe, effective or appropriate for any given patient.  [employer] does not assume any responsibility for any aspect of healthcare administered with the aid of information [employer] provides. The information contained herein is not intended to cover all possible uses, directions, precautions, warnings, drug interactions, allergic reactions, or adverse effects. If you have questions about the drugs you are taking, check with yourdoctor, nurse or pharmacist.  Copyright 8949-3791 [address] [employer]. Version: 3.01. Revision date: 9/24/2021. Care instructions adapted under license by [facility] ([facility]). If you have questions about a medical condition or this instruction, always ask your healthcare professional. [place] [address] any warranty or liability for your use of this information.

## 2022-05-25 NOTE — PROGRESS NOTES
New Patient Abstract    Reason for Referral:  Anemia during pregnancy in third trimester    Referring Provider:  Dr. Aristeo Bravo    Primary Care Provider:  Dr. Brian De La Torre History of Cancer/Hematologic disorders:  MGM, unspecified ca    Presenting Symptoms:  Extreme Fatigue, near syncope    Narrative with recent Results/Procedures/Biopsies and Dates completed:  Ms. Monico Gutierrez is a 40year old female. Her medical history includes meningioma, ADD, kidney disease, migraines, PFO, stomach discomfort, low BP, proteinuria during pregnancy, depression, postpartum anxiety, miscarriage. Her surgical history includes D&C, craniotomy, foot forgery, wisdom tooth extraction. Ms. Macarena Cruz is followed by Cardiology, neurology and her PCP. Ms. Macarena Cruz is referred to [facility] for further evaluation of anemia with s/sx of extreme fatigue. She is 31w3d. Multigravida. On 5/1/22 she was getting ready for [employer] and experienced chest tightness and palpitations associated with a syncopal feeling. She presented to [facility] [place] ED w/same complaints including dizziness, lightheadedness, and weakness. She denied other symptoms. Labs were drawn and were significant for RBC 3.44, H/H 9.9/29.4, CA 8.4, ALBUMIN 3.3. Iron panel was drawn by referring provider. No results are available at this time.           4/9/22           Notes from referring Provider:  n/a    Other pertinent information:  n/a    Presented at Tumor Board:  n/a

## 2022-05-25 NOTE — PROGRESS NOTES
HISTORY OF PRESENT ILLNESS  Lupe Hernandez is a 40 y.o. female referred for anemia in pregnancy  Reason for Referral:  Anemia during pregnancy in third trimester     Referring Provider:  Dr. Chantell Carlton     Primary Care Provider:  Dr. Genevive Goldmann History of Cancer/Hematologic disorders:  MGM, unspecified ca     Presenting Symptoms:  Extreme Fatigue, near syncope     Narrative with recent Results/Procedures/Biopsies and Dates completed:  Ms. Lupe Hernandez is a 40year old female. Her medical history includes meningioma, ADD, kidney disease, migraines, PFO, stomach discomfort, low BP, proteinuria during pregnancy, depression, postpartum anxiety, miscarriage. Her surgical history includes D&C, craniotomy, foot forgery, wisdom tooth extraction. Ms. Graham Cheatham is followed by Cardiology, neurology and her PCP. Ms. Graham Cheatham is referred to [facility] for further evaluation of anemia with s/sx of extreme fatigue. She is 31w3d. Multigravida.     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 She presented to [place] ED w/same complaints including dizziness, lightheadedness, and weakness. She denied other symptoms. Labs were drawn and were significant for RBC 3.44, H/H 9.9/29.4, CA 8.4, ALBUMIN 3.3. Iron panel was drawn by referring provider.  No results are available at this time.            4/9/22               HPI 31 weeks pregnant and 5th pregnancy  Children 3 all healthy  Feb '21 with miscarriage and dnc and blood transfusion  Patient Denies:  Nose bleeds  Gum bleeds  Bruising or petechia  Bleeding with surgery  Bleeding with accidents  Transfusions  History or free bleeding or hemophilia    No Past Medical History of   Structural Issues (PALM):  Polyps (uterine), Adenomyosis, Lieomyoma, Malignancy  Non Structural Issues (COEI): Coagulopathy, Ovulatory bleeding, endometrial, iatrogenic  HPI-Menstruation: any defines HMB (heavy menstrual bleeding)   ? >7 days  ? soaking through pad/tampon <2 hours  ? soaking clothes/bedclothes  ? passing clots  ? iron deficiency  ? anemia  Current Outpatient Medications   Medication Sig    cyclobenzaprine (FLEXERIL) 10 mg tablet Take 10 mg by mouth 3 times daily as needed for Muscle spasms    Prenatal MV & Min w/FA-DHA (PRENATAL ADULT GUMMY/DHA/FA PO) Take by mouth     No current facility-administered medications for this visit. Past Medical History:   Diagnosis Date    Heart abnormality     low BP    Heart abnormality     possible TIA?  Kidney disease     excess proteinuria in early pregnancy    Meningioma [facility])     Dr. Aleja Clemons follows    Migraines     PFO (patent foramen ovale)     Dr. Kristin Arana is Cardiologist    Proteinuria     during last pregnancy    Stomach discomfort      Past Surgical History:   Procedure Laterality Date    CRANIOTOMY      for meningioma-08/2016    ORTHOPEDIC SURGERY      foot surgery    OTHER SURGICAL HISTORY  2008    Foot    OTHER SURGICAL HISTORY  2016    Brain Tumor    WISDOM TOOTH EXTRACTION  2000     Family History   Problem Relation Age of Onset    Heart Disease Mother     Diabetes Maternal Grandmother     Heart Attack Maternal Grandmother     Cancer Maternal Grandmother     Diabetes Maternal Grandfather     Heart Attack Maternal Grandfather     Heart Disease Maternal Grandfather      Social History     Tobacco Use    Smoking status: Never Smoker    Smokeless tobacco: Never Used   Substance Use Topics    Alcohol use:  Yes     Alcohol/week: 2.0 - 3.0 standard drinks    Drug use: No     Immunization History   Administered Date(s) Administered    Influenza Trivalent 10/14/2014    MMR 10/26/2014    Tdap (Boostrix, Adacel) 10/26/2014     No Known Allergies    + blood transfusion  No iron infusion    Denies jaundice, ha, swelling, cp, ruq pain, hypertension or history of HELLP, Fatty liver of pregnancy, eclampsia or pain    Review of Systems     Review of Systems   Constitutional: Negative. HENT: Negative. Eyes: Negative. Respiratory: Negative. Cardiovascular: Negative. Gastrointestinal: Negative. Genitourinary: Negative. Musculoskeletal: Negative. Skin: Negative. Neurological: Negative. Endo/Heme/Allergies: Negative. Psychiatric/Behavioral: Negative. Pain reviewed fully and addressed this visit  Med review and reconciliation addressed fully this visit  ADLs and performance level addressed, ECOG 0 unless otherwise addressed      Vitals:    05/25/22 0949   BP: 97/64   Pulse: 100   Resp: 19   SpO2: 97%       Physical Exam       Constitutional: Well developed, well nourished female in no acute distress, sitting comfortably pregnant   HEENT: Normocephalic and atraumatic. Oropharynx is clear, mucous membranes are moist.  Pupils are equal, round, and reactive to light. Extraocular muscles are intact. Sclerae anicteric. Neck supple without JVD. No thyromegaly present. Lymph node   No palpable submandibular, cervical, supraclavicular, axillary or inguinal lymph nodes. Skin Warm and dry. No bruising and no rash noted. No erythema. No pallor. Respiratory Lungs are clear to auscultation bilaterally without wheezes, rales or rhonchi, normal air exchange without accessory muscle use. CVS Normal rate, regular rhythm and normal S1 and S2. No murmurs, gallops, or rubs. Abdomen Soft, nontender and nondistended, normoactive bowel sounds. No palpable mass. No hepatosplenomegaly. Neuro Grossly nonfocal with no obvious sensory or motor deficits. MSK Normal range of motion in general.  No edema and no tenderness. Psych Appropriate mood and affect.       Hospital Outpatient Visit on 05/25/2022   Component Date Value Ref Range Status    Color, UA 05/25/2022 YELLOW    Final    Appearance 05/25/2022 CLOUDY    Final    Specific Gravity, UA 05/25/2022 1.010  1.001 - 1.023   Final    pH, Urine 05/25/2022 7.0  5.0 - 9.0   Final  Protein, UA 05/25/2022 Negative  NEG mg/dL Final    Glucose, UA 05/25/2022 Negative  NEG mg/dL Final    Ketones, Urine 05/25/2022 Negative  NEG mg/dL Final    Bilirubin Urine 05/25/2022 Negative  NEG   Final    Blood, Urine 05/25/2022 Negative  NEG   Final    Urobilinogen, Urine 05/25/2022 0.2  0.2 - 1.0 EU/dL Final    Nitrite, Urine 05/25/2022 Negative  NEG   Final    Leukocyte Esterase, Urine 05/25/2022 MODERATE* NEG   Final    Reticulocyte Count,Automated 05/25/2022 2.4* 0.3 - 2.0 % Final    Absolute Retic # 05/25/2022 0.0844  0.026 - 0.095 M/ul Final    Immature Retic Fraction 05/25/2022 28.1* 3.0 - 15.9 % Final    Retic Hemoglobin conc. 05/25/2022 26* 29 - 35 pg Final    WBC 05/25/2022 8.1  4.3 - 11.1 K/uL Final    RBC 05/25/2022 3.56* 4.05 - 5.2 M/uL Final    Hemoglobin 05/25/2022 10.1* 11.7 - 15.4 g/dL Final    Hematocrit 05/25/2022 31.7* 35.8 - 46.3 % Final    MCV 05/25/2022 89.0  79.6 - 97.8 FL Final    MCH 05/25/2022 28.4  26.1 - 32.9 PG Final    MCHC 05/25/2022 31.9  31.4 - 35.0 g/dL Final    RDW 05/25/2022 15.2* 11.9 - 14.6 % Final    Platelets 84/59/6649 151  150 - 450 K/uL Final    MPV 05/25/2022 10.4  9.4 - 12.3 FL Final    nRBC 05/25/2022 0.00  0.0 - 0.2 K/uL Final    **Note: Absolute NRBC parameter is now reported with Hemogram**    Differential Type 05/25/2022 AUTOMATED    Final    Seg Neutrophils 05/25/2022 80* 43 - 78 % Final    Lymphocytes 05/25/2022 10* 13 - 44 % Final    Monocytes 05/25/2022 8  4.0 - 12.0 % Final    Eosinophils % 05/25/2022 1  0.5 - 7.8 % Final    Basophils 05/25/2022 0  0.0 - 2.0 % Final    Immature Granulocytes 05/25/2022 0  0.0 - 5.0 % Final    Segs Absolute 05/25/2022 6.5  1.7 - 8.2 K/UL Final    Absolute Lymph # 05/25/2022 0.8  0.5 - 4.6 K/UL Final    Absolute Mono # 05/25/2022 0.6  0.1 - 1.3 K/UL Final    Absolute Eos # 05/25/2022 0.1  0.0 - 0.8 K/UL Final    Basophils Absolute 05/25/2022 0.0  0.0 - 0.2 K/UL Final    Absolute Immature Granulocyte 05/25/2022 0.0  0.0 - 0.5 K/UL Final    WBC, UA 05/25/2022 10-20  0 /hpf Final    RBC, UA 05/25/2022 0-3  0 /hpf Final    Epithelial Cells UA 05/25/2022 20-50  0 /hpf Final    BACTERIA, URINE 05/25/2022 3+* 0 /hpf Final    Casts 05/25/2022 0  0 /lpf Final    Crystals 05/25/2022 0  0 /LPF Final    Mucus, UA 05/25/2022 0  0 /lpf Final       Patient Active Problem List   Diagnosis    Normal labor    Migraine headache    Patent foramen ovale    Rubella non-immune status, antepartum    Postpartum anxiety    Meningioma (HCC)    Iron deficiency anemia    Other fatigue    Tachycardia           ASSESSMENT and PLAN    40 y.o. y.o. yo with TRAVIS in pregnancy and a history of HMB, TRAVIS, with anemia not responsive to oral iron or intolerant of oral iron  1) TRAVIS: likely related to prior HMB and ID of pregnancy. Rule out other causes of anemia, work up pending. Recommended IV iron* and specifically addressed type of iron, route, side effects and risks/benefits and patient agrees to proceed. Patient knows to call for any questions or complications, but primary follow up with OB.    -injectafer 750mg IV x 2   -follow up 4 months post delivery to assess iron level and gauge further iron needs  2) HMB: by history and needs evaluation and management after resumption of menses  -follow up post partum 4 months  3) Bleeding Diathesis: approximately 10% risk of bleeding disorder, most common von willebrand or platelet function disorder. VWD testing discussed and will repeat in 3-6 months if first normal and platelet function testing with PFA, plt agg and glycoprotein expression evaluation if VW normal.  Smear to be evaluated for gray plt or megaplts Viola Carota).   -testing to be completed after pregnancy due to physiologic elevation of VW  Follow up 4 months post delivery  All questions answered; call with issues  Total time 60 min 50% in direct consultation about the patient's diagnosis and management  Tamela Sena MD  Director, Adolescent Young Adult [address] and Blood Disorders Program  [address] [address], [address] [place] [address] Phone        Recent network meta-analysis has established that \"parenteral iron preparations are more effective at increasing hemoglobin concentrations compared with oral preparations\". Furthermore,  IV ferric carboxymaltose was shown to have an overall better impact on hemoglobin rise as compared to iv iron sucrose.     Augusto Rodgers, the Lancet/Haematology/ July 2021

## 2022-05-26 LAB
ANA SER QL: POSITIVE
CENTROMERE B AB SER-ACNC: 1.6 AI (ref 0–0.9)
CHROMATIN AB SERPL-ACNC: <0.2 AI (ref 0–0.9)
DSDNA AB SER-ACNC: <1 IU/ML (ref 0–9)
ENA JO1 AB SER-ACNC: <0.2 AI (ref 0–0.9)
ENA RNP AB SER-ACNC: <0.2 AI (ref 0–0.9)
ENA SCL70 AB SER-ACNC: <0.2 AI (ref 0–0.9)
ENA SM AB SER-ACNC: <0.2 AI (ref 0–0.9)
ENA SS-A AB SER-ACNC: <0.2 AI (ref 0–0.9)
ENA SS-B AB SER-ACNC: <0.2 AI (ref 0–0.9)
Lab: ABNORMAL
PATH REV BLD -IMP: NORMAL
TRANSFERRIN SERPL-SCNC: 34.1 NMOL/L (ref 12.2–27.3)

## 2022-05-27 ENCOUNTER — TELEPHONE (OUTPATIENT)
Dept: ONCOLOGY | Age: 37
End: 2022-05-27

## 2022-05-27 DIAGNOSIS — E53.8 LOW VITAMIN B12 LEVEL: Primary | ICD-10-CM

## 2022-05-27 NOTE — TELEPHONE ENCOUNTER
Call to the patient and she would like to review the AQUILES and other labs that are wonky. Msg sent to Frank Givens.

## 2022-05-27 NOTE — TELEPHONE ENCOUNTER
Called pt back  Labs reviewed in detail. All questions answered. Pt denies rash, joint pains, etc   Pt to start Vit B12 1000mcg by mouth daily. Will recheck at follow up.

## 2022-06-01 ENCOUNTER — HOSPITAL ENCOUNTER (OUTPATIENT)
Dept: INFUSION THERAPY | Age: 37
Discharge: HOME | End: 2022-06-01
Payer: COMMERCIAL

## 2022-06-01 VITALS
TEMPERATURE: 97.8 F | DIASTOLIC BLOOD PRESSURE: 55 MMHG | OXYGEN SATURATION: 99 % | RESPIRATION RATE: 18 BRPM | HEART RATE: 118 BPM | SYSTOLIC BLOOD PRESSURE: 125 MMHG

## 2022-06-01 DIAGNOSIS — R00.0 TACHYCARDIA: ICD-10-CM

## 2022-06-01 DIAGNOSIS — D50.9 IRON DEFICIENCY ANEMIA, UNSPECIFIED IRON DEFICIENCY ANEMIA TYPE: Primary | ICD-10-CM

## 2022-06-01 DIAGNOSIS — R53.83 OTHER FATIGUE: ICD-10-CM

## 2022-06-01 PROCEDURE — 6360000002 HC RX W HCPCS: Performed by: PEDIATRICS

## 2022-06-01 PROCEDURE — 2580000003 HC RX 258: Performed by: PEDIATRICS

## 2022-06-01 PROCEDURE — 96365 THER/PROPH/DIAG IV INF INIT: CPT

## 2022-06-01 RX ORDER — SODIUM CHLORIDE 0.9 % (FLUSH) 0.9 %
5-40 SYRINGE (ML) INJECTION PRN
Status: CANCELLED | OUTPATIENT
Start: 2022-06-08

## 2022-06-01 RX ORDER — DIPHENHYDRAMINE HYDROCHLORIDE 50 MG/ML
50 INJECTION INTRAMUSCULAR; INTRAVENOUS
Status: CANCELLED | OUTPATIENT
Start: 2022-06-08

## 2022-06-01 RX ORDER — EPINEPHRINE 1 MG/ML
0.3 INJECTION, SOLUTION, CONCENTRATE INTRAVENOUS PRN
Status: CANCELLED | OUTPATIENT
Start: 2022-06-08

## 2022-06-01 RX ORDER — ACETAMINOPHEN 325 MG/1
650 TABLET ORAL
Status: CANCELLED | OUTPATIENT
Start: 2022-06-08

## 2022-06-01 RX ORDER — ONDANSETRON 2 MG/ML
8 INJECTION INTRAMUSCULAR; INTRAVENOUS
Status: CANCELLED | OUTPATIENT
Start: 2022-06-08

## 2022-06-01 RX ORDER — SODIUM CHLORIDE 9 MG/ML
INJECTION, SOLUTION INTRAVENOUS CONTINUOUS
Status: CANCELLED | OUTPATIENT
Start: 2022-06-08

## 2022-06-01 RX ORDER — SODIUM CHLORIDE 9 MG/ML
5-250 INJECTION, SOLUTION INTRAVENOUS PRN
Status: CANCELLED | OUTPATIENT
Start: 2022-06-08

## 2022-06-01 RX ORDER — SODIUM CHLORIDE 0.9 % (FLUSH) 0.9 %
5-40 SYRINGE (ML) INJECTION PRN
Status: DISCONTINUED | OUTPATIENT
Start: 2022-06-01 | End: 2022-06-02 | Stop reason: HOSPADM

## 2022-06-01 RX ORDER — ALBUTEROL SULFATE 90 UG/1
4 AEROSOL, METERED RESPIRATORY (INHALATION) PRN
Status: CANCELLED | OUTPATIENT
Start: 2022-06-08

## 2022-06-01 RX ADMIN — FERRIC CARBOXYMALTOSE INJECTION 750 MG: 50 INJECTION, SOLUTION INTRAVENOUS at 14:30

## 2022-06-01 RX ADMIN — SODIUM CHLORIDE, PRESERVATIVE FREE 10 ML: 5 INJECTION INTRAVENOUS at 14:13

## 2022-06-01 NOTE — PROGRESS NOTES
Patient arrived ambulatory to infusion center. Injectafer completed. Patient tolerated well. Observed for 30 minutes. Discharged ambulatory.  Patient aware of next infusion on 6/8 at 1:30pm.

## 2022-06-10 ENCOUNTER — HOSPITAL ENCOUNTER (OUTPATIENT)
Dept: INFUSION THERAPY | Age: 37
Discharge: HOME | End: 2022-06-10
Payer: COMMERCIAL

## 2022-06-10 VITALS
RESPIRATION RATE: 18 BRPM | TEMPERATURE: 98.3 F | HEART RATE: 102 BPM | SYSTOLIC BLOOD PRESSURE: 117 MMHG | OXYGEN SATURATION: 98 % | DIASTOLIC BLOOD PRESSURE: 59 MMHG

## 2022-06-10 DIAGNOSIS — R00.0 TACHYCARDIA: ICD-10-CM

## 2022-06-10 DIAGNOSIS — R53.83 OTHER FATIGUE: ICD-10-CM

## 2022-06-10 DIAGNOSIS — D50.9 IRON DEFICIENCY ANEMIA, UNSPECIFIED IRON DEFICIENCY ANEMIA TYPE: Primary | ICD-10-CM

## 2022-06-10 PROCEDURE — 2580000003 HC RX 258: Performed by: PEDIATRICS

## 2022-06-10 PROCEDURE — 6360000002 HC RX W HCPCS: Performed by: PEDIATRICS

## 2022-06-10 PROCEDURE — 96365 THER/PROPH/DIAG IV INF INIT: CPT

## 2022-06-10 RX ORDER — SODIUM CHLORIDE 0.9 % (FLUSH) 0.9 %
5-40 SYRINGE (ML) INJECTION PRN
Status: DISCONTINUED | OUTPATIENT
Start: 2022-06-10 | End: 2022-06-11 | Stop reason: HOSPADM

## 2022-06-10 RX ORDER — SODIUM CHLORIDE 0.9 % (FLUSH) 0.9 %
5-40 SYRINGE (ML) INJECTION PRN
OUTPATIENT
Start: 2022-06-15

## 2022-06-10 RX ORDER — SODIUM CHLORIDE 9 MG/ML
5-250 INJECTION, SOLUTION INTRAVENOUS PRN
OUTPATIENT
Start: 2022-06-15

## 2022-06-10 RX ORDER — EPINEPHRINE 1 MG/ML
0.3 INJECTION, SOLUTION, CONCENTRATE INTRAVENOUS PRN
OUTPATIENT
Start: 2022-06-15

## 2022-06-10 RX ORDER — SODIUM CHLORIDE 9 MG/ML
5-250 INJECTION, SOLUTION INTRAVENOUS PRN
Status: DISCONTINUED | OUTPATIENT
Start: 2022-06-10 | End: 2022-06-11 | Stop reason: HOSPADM

## 2022-06-10 RX ORDER — SODIUM CHLORIDE 9 MG/ML
INJECTION, SOLUTION INTRAVENOUS CONTINUOUS
OUTPATIENT
Start: 2022-06-15

## 2022-06-10 RX ORDER — ACETAMINOPHEN 325 MG/1
650 TABLET ORAL
OUTPATIENT
Start: 2022-06-15

## 2022-06-10 RX ORDER — ONDANSETRON 2 MG/ML
8 INJECTION INTRAMUSCULAR; INTRAVENOUS
OUTPATIENT
Start: 2022-06-15

## 2022-06-10 RX ORDER — ALBUTEROL SULFATE 90 UG/1
4 AEROSOL, METERED RESPIRATORY (INHALATION) PRN
OUTPATIENT
Start: 2022-06-15

## 2022-06-10 RX ORDER — DIPHENHYDRAMINE HYDROCHLORIDE 50 MG/ML
50 INJECTION INTRAMUSCULAR; INTRAVENOUS
OUTPATIENT
Start: 2022-06-15

## 2022-06-10 RX ADMIN — FERRIC CARBOXYMALTOSE INJECTION 750 MG: 50 INJECTION, SOLUTION INTRAVENOUS at 15:30

## 2022-06-10 RX ADMIN — SODIUM CHLORIDE, PRESERVATIVE FREE 10 ML: 5 INJECTION INTRAVENOUS at 15:20

## 2022-06-10 RX ADMIN — SODIUM CHLORIDE 250 ML/HR: 9 INJECTION, SOLUTION INTRAVENOUS at 15:51

## 2022-11-18 DIAGNOSIS — E53.8 LOW VITAMIN B12 LEVEL: Primary | ICD-10-CM

## 2022-11-22 ENCOUNTER — HOSPITAL ENCOUNTER (OUTPATIENT)
Dept: LAB | Age: 37
Discharge: HOME | End: 2022-11-25
Payer: COMMERCIAL

## 2022-11-22 ENCOUNTER — OFFICE VISIT (OUTPATIENT)
Dept: ONCOLOGY | Age: 37
End: 2022-11-22
Payer: COMMERCIAL

## 2022-11-22 VITALS
HEART RATE: 69 BPM | DIASTOLIC BLOOD PRESSURE: 70 MMHG | SYSTOLIC BLOOD PRESSURE: 114 MMHG | WEIGHT: 184.5 LBS | RESPIRATION RATE: 16 BRPM | BODY MASS INDEX: 29.65 KG/M2 | TEMPERATURE: 98.7 F | OXYGEN SATURATION: 98 % | HEIGHT: 66 IN

## 2022-11-22 DIAGNOSIS — D50.9 IRON DEFICIENCY ANEMIA, UNSPECIFIED IRON DEFICIENCY ANEMIA TYPE: Primary | ICD-10-CM

## 2022-11-22 DIAGNOSIS — D50.9 IRON DEFICIENCY ANEMIA, UNSPECIFIED IRON DEFICIENCY ANEMIA TYPE: ICD-10-CM

## 2022-11-22 DIAGNOSIS — R00.0 TACHYCARDIA: ICD-10-CM

## 2022-11-22 DIAGNOSIS — E53.8 LOW VITAMIN B12 LEVEL: ICD-10-CM

## 2022-11-22 DIAGNOSIS — R53.83 OTHER FATIGUE: ICD-10-CM

## 2022-11-22 DIAGNOSIS — N92.0 MENORRHAGIA WITH REGULAR CYCLE: ICD-10-CM

## 2022-11-22 LAB
ABO + RH BLD: NORMAL
ALBUMIN SERPL-MCNC: 3.6 G/DL (ref 3.5–5)
ALBUMIN/GLOB SERPL: 1.1 {RATIO} (ref 0.4–1.6)
ALP SERPL-CCNC: 94 U/L (ref 50–136)
ALT SERPL-CCNC: 23 U/L (ref 12–65)
ANION GAP SERPL CALC-SCNC: 7 MMOL/L (ref 2–11)
APTT PPP: 30.5 SEC (ref 24.5–34.2)
AST SERPL-CCNC: 13 U/L (ref 15–37)
BASOPHILS # BLD: 0 K/UL (ref 0–0.2)
BASOPHILS NFR BLD: 0 % (ref 0–2)
BILIRUB SERPL-MCNC: 0.3 MG/DL (ref 0.2–1.1)
BUN SERPL-MCNC: 16 MG/DL (ref 6–23)
CALCIUM SERPL-MCNC: 8.7 MG/DL (ref 8.3–10.4)
CHLORIDE SERPL-SCNC: 108 MMOL/L (ref 101–110)
CO2 SERPL-SCNC: 25 MMOL/L (ref 21–32)
CREAT SERPL-MCNC: 0.7 MG/DL (ref 0.6–1)
DIFFERENTIAL METHOD BLD: NORMAL
EOSINOPHIL # BLD: 0.1 K/UL (ref 0–0.8)
EOSINOPHIL NFR BLD: 2 % (ref 0.5–7.8)
ERYTHROCYTE [DISTWIDTH] IN BLOOD BY AUTOMATED COUNT: 13.6 % (ref 11.9–14.6)
FERRITIN SERPL-MCNC: 259 NG/ML (ref 8–388)
FIBRINOGEN PPP-MCNC: 345 MG/DL (ref 190–501)
GLOBULIN SER CALC-MCNC: 3.2 G/DL (ref 2.8–4.5)
GLUCOSE SERPL-MCNC: 93 MG/DL (ref 65–100)
HCT VFR BLD AUTO: 39.4 % (ref 35.8–46.3)
HGB BLD-MCNC: 13.2 G/DL (ref 11.7–15.4)
HGB RETIC QN AUTO: 35 PG (ref 29–35)
IMM GRANULOCYTES # BLD AUTO: 0 K/UL (ref 0–0.5)
IMM GRANULOCYTES NFR BLD AUTO: 0 % (ref 0–5)
IMM RETICS NFR: 10 % (ref 3–15.9)
INR PPP: 0.9
IRON SATN MFR SERPL: 30 %
IRON SERPL-MCNC: 89 UG/DL (ref 35–150)
LYMPHOCYTES # BLD: 1.3 K/UL (ref 0.5–4.6)
LYMPHOCYTES NFR BLD: 24 % (ref 13–44)
MCH RBC QN AUTO: 30.4 PG (ref 26.1–32.9)
MCHC RBC AUTO-ENTMCNC: 33.5 G/DL (ref 31.4–35)
MCV RBC AUTO: 90.8 FL (ref 82–102)
MONOCYTES # BLD: 0.4 K/UL (ref 0.1–1.3)
MONOCYTES NFR BLD: 7 % (ref 4–12)
NEUTS SEG # BLD: 3.7 K/UL (ref 1.7–8.2)
NEUTS SEG NFR BLD: 66 % (ref 43–78)
NRBC # BLD: 0 K/UL (ref 0–0.2)
PLATELET # BLD AUTO: 215 K/UL (ref 150–450)
PMV BLD AUTO: 9.5 FL (ref 9.4–12.3)
POTASSIUM SERPL-SCNC: 4 MMOL/L (ref 3.5–5.1)
PROT SERPL-MCNC: 6.8 G/DL (ref 6.3–8.2)
PROTHROMBIN TIME: 12.7 SEC (ref 12.6–14.3)
RBC # BLD AUTO: 4.34 M/UL (ref 4.05–5.2)
RETICS # AUTO: 0.08 M/UL (ref 0.03–0.1)
RETICS/RBC NFR AUTO: 1.8 % (ref 0.3–2)
SODIUM SERPL-SCNC: 140 MMOL/L (ref 133–143)
TIBC SERPL-MCNC: 301 UG/DL (ref 250–450)
VIT B12 SERPL-MCNC: 369 PG/ML (ref 193–986)
WBC # BLD AUTO: 5.6 K/UL (ref 4.3–11.1)

## 2022-11-22 PROCEDURE — 80053 COMPREHEN METABOLIC PANEL: CPT

## 2022-11-22 PROCEDURE — 85610 PROTHROMBIN TIME: CPT

## 2022-11-22 PROCEDURE — 85670 THROMBIN TIME PLASMA: CPT

## 2022-11-22 PROCEDURE — 99214 OFFICE O/P EST MOD 30 MIN: CPT | Performed by: PEDIATRICS

## 2022-11-22 PROCEDURE — 82607 VITAMIN B-12: CPT

## 2022-11-22 PROCEDURE — 85384 FIBRINOGEN ACTIVITY: CPT

## 2022-11-22 PROCEDURE — 85240 CLOT FACTOR VIII AHG 1 STAGE: CPT

## 2022-11-22 PROCEDURE — 85730 THROMBOPLASTIN TIME PARTIAL: CPT

## 2022-11-22 PROCEDURE — 83540 ASSAY OF IRON: CPT

## 2022-11-22 PROCEDURE — 85025 COMPLETE CBC W/AUTO DIFF WBC: CPT

## 2022-11-22 PROCEDURE — 85046 RETICYTE/HGB CONCENTRATE: CPT

## 2022-11-22 PROCEDURE — 86901 BLOOD TYPING SEROLOGIC RH(D): CPT

## 2022-11-22 PROCEDURE — 82728 ASSAY OF FERRITIN: CPT

## 2022-11-22 PROCEDURE — 36415 COLL VENOUS BLD VENIPUNCTURE: CPT

## 2022-11-22 RX ORDER — SERTRALINE HYDROCHLORIDE 100 MG/1
TABLET, FILM COATED ORAL
COMMUNITY
Start: 2022-11-12

## 2022-11-22 ASSESSMENT — PATIENT HEALTH QUESTIONNAIRE - PHQ9
1. LITTLE INTEREST OR PLEASURE IN DOING THINGS: 0
SUM OF ALL RESPONSES TO PHQ QUESTIONS 1-9: 0
SUM OF ALL RESPONSES TO PHQ QUESTIONS 1-9: 0
SUM OF ALL RESPONSES TO PHQ9 QUESTIONS 1 & 2: 0
SUM OF ALL RESPONSES TO PHQ QUESTIONS 1-9: 0
2. FEELING DOWN, DEPRESSED OR HOPELESS: 0
SUM OF ALL RESPONSES TO PHQ QUESTIONS 1-9: 0

## 2022-11-22 NOTE — PATIENT INSTRUCTIONS
Patient Instructions from Today's Visit    Reason for Visit:  Follow up for TRAVIS    S/p Delivery Summer 2022      S/p IV iron: Injectafer June 2022 during pregnancy   And after delivery for report    Hx blood transfusion after miscarriage    Plan: We will see you back in 1 year to recheck your CBC and iron stores. Your iron stores and hemoglobin are normal today! Follow Up:  Hospital Outpatient Visit on 11/22/2022   Component Date Value Ref Range Status    Iron 11/22/2022 89  35 - 150 ug/dL Final    Comment: Known Interfering Substances section:  \"Iron values may be falsely elevated in  serum samples from patients with  anticoagulants (e.g., hemodialysis patients). \"  Limitations of Procedure section:  \"Turbidity resulting from precipitation of  fibrinogen in the serum of patients treated  with anticoagulants (e.g. hemodialysis  patients) may cause spuriously elevated  iron results. \"      TIBC 11/22/2022 301  250 - 450 ug/dL Final    TRANSFERRIN SATURATION 11/22/2022 30  >20 % Final    Ferritin 11/22/2022 259  8 - 388 NG/ML Final    Reticulocyte Count,Automated 11/22/2022 1.8  0.3 - 2.0 % Final    Absolute Retic # 11/22/2022 0.0799  0.026 - 0.095 M/ul Final    Immature Retic Fraction 11/22/2022 10.0  3.0 - 15.9 % Final    Retic Hemoglobin conc. 11/22/2022 35  29 - 35 pg Final    Sodium 11/22/2022 140  133 - 143 mmol/L Final    Potassium 11/22/2022 4.0  3.5 - 5.1 mmol/L Final    Chloride 11/22/2022 108  101 - 110 mmol/L Final    CO2 11/22/2022 25  21 - 32 mmol/L Final    Anion Gap 11/22/2022 7  2 - 11 mmol/L Final    Glucose 11/22/2022 93  65 - 100 mg/dL Final    BUN 11/22/2022 16  6 - 23 MG/DL Final    Creatinine 11/22/2022 0.70  0.6 - 1.0 MG/DL Final    Est, Glom Filt Rate 11/22/2022 >60  >60 ml/min/1.73m2 Final    Comment:      Pediatric calculator link: Tony.at. org/professionals/kdoqi/gfr_calculatorped       Effective Oct 3, 2022       These results are not intended for use in patients <18 years of age. eGFR results are calculated without a race factor using  the 2021 CKD-EPI equation. Careful clinical correlation is recommended, particularly when comparing to results calculated using previous equations. The CKD-EPI equation is less accurate in patients with extremes of muscle mass, extra-renal metabolism of creatinine, excessive creatine ingestion, or following therapy that affects renal tubular secretion.       Calcium 11/22/2022 8.7  8.3 - 10.4 MG/DL Final    Total Bilirubin 11/22/2022 0.3  0.2 - 1.1 MG/DL Final    ALT 11/22/2022 23  12 - 65 U/L Final    AST 11/22/2022 13 (A)  15 - 37 U/L Final    Alk Phosphatase 11/22/2022 94  50 - 136 U/L Final    Total Protein 11/22/2022 6.8  6.3 - 8.2 g/dL Final    Albumin 11/22/2022 3.6  3.5 - 5.0 g/dL Final    Globulin 11/22/2022 3.2  2.8 - 4.5 g/dL Final    Albumin/Globulin Ratio 11/22/2022 1.1  0.4 - 1.6   Final    WBC 11/22/2022 5.6  4.3 - 11.1 K/uL Final    RESULTS CHECKED X 2    RBC 11/22/2022 4.34  4.05 - 5.2 M/uL Final    Hemoglobin 11/22/2022 13.2  11.7 - 15.4 g/dL Final    Hematocrit 11/22/2022 39.4  35.8 - 46.3 % Final    MCV 11/22/2022 90.8  82.0 - 102.0 FL Final    MCH 11/22/2022 30.4  26.1 - 32.9 PG Final    MCHC 11/22/2022 33.5  31.4 - 35.0 g/dL Final    RDW 11/22/2022 13.6  11.9 - 14.6 % Final    Platelets 31/62/7105 215  150 - 450 K/uL Final    MPV 11/22/2022 9.5  9.4 - 12.3 FL Final    nRBC 11/22/2022 0.00  0.0 - 0.2 K/uL Final    **Note: Absolute NRBC parameter is now reported with Hemogram**    Differential Type 11/22/2022 AUTOMATED    Final    Seg Neutrophils 11/22/2022 66  43 - 78 % Final    Lymphocytes 11/22/2022 24  13 - 44 % Final    Monocytes 11/22/2022 7  4.0 - 12.0 % Final    Eosinophils % 11/22/2022 2  0.5 - 7.8 % Final    Basophils 11/22/2022 0  0.0 - 2.0 % Final    Immature Granulocytes 11/22/2022 0  0.0 - 5.0 % Final    Segs Absolute 11/22/2022 3.7  1.7 - 8.2 K/UL Final    Absolute Lymph # 11/22/2022 1.3  0.5 - 4.6 K/UL Final Absolute Mono # 11/22/2022 0.4  0.1 - 1.3 K/UL Final    Absolute Eos # 11/22/2022 0.1  0.0 - 0.8 K/UL Final    Basophils Absolute 11/22/2022 0.0  0.0 - 0.2 K/UL Final    Absolute Immature Granulocyte 11/22/2022 0.0  0.0 - 0.5 K/UL Final    Fibrinogen 11/22/2022 345  190 - 501 mg/dL Final    PTT 11/22/2022 30.5  24.5 - 34.2 SEC Final    Comment: Heparin Therapeutic Range = 74 - 123 seconds  When clinically evaluating a  prolonged PTT, preanalytic variables  including possible fluctuations in  levels of heparin should be considered. Protime 11/22/2022 12.7  12.6 - 14.3 sec Final    INR 11/22/2022 0.9    Final    Comment: Suggested therapeutic INR range:  Venous thrombosis and embolus  2.0-3.0  Prosthetic heart valve         2.5-3.5  ** Note new reference range and method **      Factor VIII Activity 11/22/2022 PENDING  % Incomplete    VON WILLEBRAND FACTOR (VWF) AG, 08* 11/22/2022 PENDING  % Incomplete    VWF Activity 11/22/2022 PENDING  % Incomplete         Recent Lab Results:      Treatment Summary has been discussed and given to patient: NA        -------------------------------------------------------------------------------------------------------------------  Please call our office at [phone] if you have any  of the following symptoms:   Fever of 100.5 or greater  Chills  Shortness of breath  Swelling or pain in one leg    After office hours an answering service is available and will contact a provider for emergencies or if you are experiencing any of the above symptoms. Patient has  My Chart. My Chart log in information explained on the after visit summary printout at the [facility] desk.

## 2022-11-22 NOTE — PROGRESS NOTES
HISTORY OF PRESENT ILLNESS  Robert Araujo is a 40 y.o. female referred for anemia in pregnancy  Reason for Referral:  Anemia during pregnancy in third trimester     Referring Provider:  Dr. Madie Caballero     Primary Care Provider:  Dr. Jemal Jacob History of Cancer/Hematologic disorders:  MGM, unspecified ca     Presenting Symptoms:  Extreme Fatigue, near syncope     Narrative with recent Results/Procedures/Biopsies and Dates completed:  Ms. Robert Araujo is a 40year old female. Her medical history includes meningioma, ADD, kidney disease, migraines, PFO, stomach discomfort, low BP, proteinuria during pregnancy, depression, postpartum anxiety, miscarriage. Her surgical history includes D&C, craniotomy, foot forgery, wisdom tooth extraction. Ms. John Dc is followed by Cardiology, neurology and her PCP. Ms. John Dc is referred to [facility] for further evaluation of anemia with s/sx of extreme fatigue. She is 31w3d. Multigravida. On 5/1/22 she was getting ready for [facility] and experienced chest tightness and palpitations associated with a syncopal feeling. She presented to [place] ED w/same complaints including dizziness, lightheadedness, and weakness. She denied other symptoms. Labs were drawn and were significant for RBC 3.44, H/H 9.9/29.4, CA 8.4, ALBUMIN 3.3. Iron panel was drawn by referring provider. No results are available at this time.             4/9/22               HPI 31 weeks pregnant and 5th pregnancy  Children 3 all healthy  Feb '21 with miscarriage and dnc and blood transfusion  Patient Denies:  Nose bleeds  Gum bleeds  Bruising or petechia  Bleeding with surgery  Bleeding with accidents  Transfusions  History or free bleeding or hemophilia    No Past Medical History of   Structural Issues (PALM):  Polyps (uterine), Adenomyosis, Lieomyoma, Malignancy  Non Structural Issues (COEI): Coagulopathy, Ovulatory bleeding, endometrial, iatrogenic  HPI-Menstruation: any defines HMB (heavy menstrual bleeding)   [] >7 days  [] soaking through pad/tampon <2 hours  [] soaking clothes/bedclothes  [] passing clots  [] iron deficiency  [] anemia  Current Outpatient Medications   Medication Sig    sertraline (ZOLOFT) 100 MG tablet TAKE 2 TABLETS BY MOUTH EVERY DAY    Prenatal MV & Min w/FA-DHA (PRENATAL ADULT GUMMY/DHA/FA PO) Take by mouth    cyclobenzaprine (FLEXERIL) 10 mg tablet Take 10 mg by mouth 3 times daily as needed for Muscle spasms (Patient not taking: Reported on 11/22/2022)     No current facility-administered medications for this visit. Past Medical History:   Diagnosis Date    Heart abnormality     low BP    Heart abnormality     possible TIA? Iron deficiency anemia 5/25/2022    Kidney disease     excess proteinuria in early pregnancy    Meningioma [facility])     Dr. Obed Adair follows    Migraines     PFO (patent foramen ovale)     Dr. Linnea Thayer is Cardiologist    Proteinuria     during last pregnancy    Stomach discomfort      Past Surgical History:   Procedure Laterality Date    CRANIOTOMY      for meningioma-08/2016    ORTHOPEDIC SURGERY      foot surgery    OTHER SURGICAL HISTORY  2008    Foot    OTHER SURGICAL HISTORY  2016    Brain Tumor    WISDOM TOOTH EXTRACTION  2000     Family History   Problem Relation Age of Onset    Heart Disease Mother     Diabetes Maternal Grandmother     Heart Attack Maternal Grandmother     Cancer Maternal Grandmother     Diabetes Maternal Grandfather     Heart Attack Maternal Grandfather     Heart Disease Maternal Grandfather      Social History     Tobacco Use    Smoking status: Never    Smokeless tobacco: Never   Substance Use Topics    Alcohol use:  Yes     Alcohol/week: 2.0 - 3.0 standard drinks    Drug use: No     Immunization History   Administered Date(s) Administered    Influenza Trivalent 10/14/2014    MMR 10/26/2014    Tdap (Boostrix, Adacel) 10/26/2014     No Known Allergies    + blood transfusion  No iron infusion    Denies jaundice, ha, swelling, cp, ruq pain, hypertension or history of HELLP, Fatty liver of pregnancy, eclampsia or pain    Review of Systems     Review of Systems   Constitutional: Negative. HENT: Negative. Eyes: Negative. Respiratory: Negative. Cardiovascular: Negative. Gastrointestinal: Negative. Genitourinary: Negative. Musculoskeletal: Negative. Skin: Negative. Neurological: Negative. Endo/Heme/Allergies: Negative. Psychiatric/Behavioral: Negative. Pain reviewed fully and addressed this visit  Med review and reconciliation addressed fully this visit  ADLs and performance level addressed, ECOG 0 unless otherwise addressed      Vitals:    11/22/22 0946   BP: 114/70   Pulse: 69   Resp: 16   Temp: 98.7 °F (37.1 °C)   SpO2: 98%       Physical Exam       Constitutional: Well developed, well nourished female in no acute distress, sitting comfortably pregnant   HEENT: Normocephalic and atraumatic. Oropharynx is clear, mucous membranes are moist.  Pupils are equal, round, and reactive to light. Extraocular muscles are intact. Sclerae anicteric. Neck supple without JVD. No thyromegaly present. Lymph node   No palpable submandibular, cervical, supraclavicular, axillary or inguinal lymph nodes. Skin Warm and dry. No bruising and no rash noted. No erythema. No pallor. Respiratory Lungs are clear to auscultation bilaterally without wheezes, rales or rhonchi, normal air exchange without accessory muscle use. CVS Normal rate, regular rhythm and normal S1 and S2. No murmurs, gallops, or rubs. Abdomen Soft, nontender and nondistended, normoactive bowel sounds. No palpable mass. No hepatosplenomegaly. Neuro Grossly nonfocal with no obvious sensory or motor deficits. MSK Normal range of motion in general.  No edema and no tenderness. Psych Appropriate mood and affect.       Hospital Outpatient Visit on 11/22/2022   Component Date Value Ref Range Status    Iron 11/22/2022 89  35 - 150 ug/dL Final    Comment: Known Interfering Substances section:  \"Iron values may be falsely elevated in  serum samples from patients with  anticoagulants (e.g., hemodialysis patients). \"  Limitations of Procedure section:  \"Turbidity resulting from precipitation of  fibrinogen in the serum of patients treated  with anticoagulants (e.g. hemodialysis  patients) may cause spuriously elevated  iron results. \"      TIBC 11/22/2022 301  250 - 450 ug/dL Final    TRANSFERRIN SATURATION 11/22/2022 30  >20 % Final    Ferritin 11/22/2022 259  8 - 388 NG/ML Final    Reticulocyte Count,Automated 11/22/2022 1.8  0.3 - 2.0 % Final    Absolute Retic # 11/22/2022 0.0799  0.026 - 0.095 M/ul Final    Immature Retic Fraction 11/22/2022 10.0  3.0 - 15.9 % Final    Retic Hemoglobin conc. 11/22/2022 35  29 - 35 pg Final    Sodium 11/22/2022 140  133 - 143 mmol/L Final    Potassium 11/22/2022 4.0  3.5 - 5.1 mmol/L Final    Chloride 11/22/2022 108  101 - 110 mmol/L Final    CO2 11/22/2022 25  21 - 32 mmol/L Final    Anion Gap 11/22/2022 7  2 - 11 mmol/L Final    Glucose 11/22/2022 93  65 - 100 mg/dL Final    BUN 11/22/2022 16  6 - 23 MG/DL Final    Creatinine 11/22/2022 0.70  0.6 - 1.0 MG/DL Final    Est, Glom Filt Rate 11/22/2022 >60  >60 ml/min/1.73m2 Final    Comment:      Pediatric calculator link: [email or web address].at. org/professionals/kdoqi/gfr_calculatorped       Effective Oct 3, 2022       These results are not intended for use in patients <25years of age. eGFR results are calculated without a race factor using  the 2021 CKD-EPI equation. Careful clinical correlation is recommended, particularly when comparing to results calculated using previous equations. The CKD-EPI equation is less accurate in patients with extremes of muscle mass, extra-renal metabolism of creatinine, excessive creatine ingestion, or following therapy that affects renal tubular secretion.       Calcium 11/22/2022 8.7  8.3 - 10.4 MG/DL Final    Total Bilirubin 11/22/2022 0.3  0.2 - should be considered. Protime 11/22/2022 12.7  12.6 - 14.3 sec Final    INR 11/22/2022 0.9    Final    Comment: Suggested therapeutic INR range:  Venous thrombosis and embolus  2.0-3.0  Prosthetic heart valve         2.5-3.5  ** Note new reference range and method **      Factor VIII Activity 11/22/2022 PENDING  % Incomplete    VON WILLEBRAND FACTOR (VWF) AG, 08* 11/22/2022 PENDING  % Incomplete    VWF Activity 11/22/2022 PENDING  % Incomplete       Patient Active Problem List   Diagnosis    Normal labor    Migraine headache    Patent foramen ovale    Rubella non-immune status, antepartum    Postpartum anxiety    Meningioma (HCC)    Iron deficiency anemia    Other fatigue    Tachycardia           ASSESSMENT and PLAN    40 y.o. y.o. yo with TRAVIS in pregnancy and a history of HMB, TRAVIS, with anemia not responsive to oral iron or intolerant of oral iron  1) TRAVIS: likely related to prior HMB and ID of pregnancy. Rule out other causes of anemia, work up pending. Recommended IV iron* and specifically addressed type of iron, route, side effects and risks/benefits and patient agrees to proceed. Patient knows to call for any questions or complications, but primary follow up with OB.    -injectafer 750mg IV x 2   -follow up 4 months post delivery to assess iron level and gauge further iron needs  2) HMB: by history and needs evaluation and management after resumption of menses  -follow up post partum 4 months  3) Bleeding Diathesis: approximately 10% risk of bleeding disorder, most common von willebrand or platelet function disorder. VWD testing discussed and will repeat in 3-6 months if first normal and platelet function testing with PFA, plt agg and glycoprotein expression evaluation if VW normal.  Smear to be evaluated for gray plt or megaplts [facility]).   -testing to be completed after pregnancy due to physiologic elevation of VW  Follow up 4 months post delivery  All questions answered; call with issues    11/22/22  Doing well  Tolerated iv iron well  No new issues  TRAVIS resolved  Bleeding screen pending  Follow up 1 year    Total time35 min 50% in direct consultation about the patient's diagnosis and management  Chencho Mcarthur MD  Director, [address] and [address] [place] [address], [address] [place] [address]  [employer] Phone        Recent network meta-analysis has established that \"parenteral iron preparations are more effective at increasing hemoglobin concentrations compared with oral preparations\". Furthermore,  IV ferric carboxymaltose was shown to have an overall better impact on hemoglobin rise as compared to iv iron sucrose.     Violetta Sears, the Lancet/Haematology/ July 2021

## 2022-11-24 LAB
FACT VIII ACT/NOR PPP: 145 % (ref 56–140)
INTERPRETATION: ABNORMAL
VWF AG ACT/NOR PPP IA: 148 % (ref 50–200)
VWF:RCO ACT/NOR PPP PL AGG: 153 % (ref 50–200)

## 2023-11-24 ENCOUNTER — TELEPHONE (OUTPATIENT)
Dept: ONCOLOGY | Age: 38
End: 2023-11-24

## 2023-11-24 DIAGNOSIS — R79.89 LOW VITAMIN B12 LEVEL: ICD-10-CM

## 2023-11-24 DIAGNOSIS — D50.9 IRON DEFICIENCY ANEMIA, UNSPECIFIED IRON DEFICIENCY ANEMIA TYPE: Primary | ICD-10-CM
